# Patient Record
Sex: FEMALE | Race: WHITE | NOT HISPANIC OR LATINO | Employment: PART TIME | URBAN - METROPOLITAN AREA
[De-identification: names, ages, dates, MRNs, and addresses within clinical notes are randomized per-mention and may not be internally consistent; named-entity substitution may affect disease eponyms.]

---

## 2017-01-02 ENCOUNTER — APPOINTMENT (EMERGENCY)
Dept: RADIOLOGY | Facility: HOSPITAL | Age: 32
End: 2017-01-02
Payer: COMMERCIAL

## 2017-01-02 ENCOUNTER — HOSPITAL ENCOUNTER (EMERGENCY)
Facility: HOSPITAL | Age: 32
Discharge: HOME | End: 2017-01-02
Attending: EMERGENCY MEDICINE
Payer: COMMERCIAL

## 2017-01-02 VITALS
RESPIRATION RATE: 12 BRPM | HEART RATE: 90 BPM | SYSTOLIC BLOOD PRESSURE: 133 MMHG | DIASTOLIC BLOOD PRESSURE: 77 MMHG | OXYGEN SATURATION: 98 % | TEMPERATURE: 98.4 F
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
## 2017-01-02 DIAGNOSIS — S50.812A ABRASION OF LEFT FOREARM, INITIAL ENCOUNTER: Primary | ICD-10-CM

## 2017-01-02 DIAGNOSIS — S50.12XA CONTUSION OF LEFT FOREARM, INITIAL ENCOUNTER: ICD-10-CM

## 2017-01-02 PROCEDURE — 99284 EMERGENCY DEPT VISIT MOD MDM: CPT

## 2017-01-02 PROCEDURE — 73090 X-RAY EXAM OF FOREARM: CPT

## 2017-01-02 RX ORDER — IBUPROFEN 400 MG/1
400 TABLET ORAL EVERY 6 HOURS PRN
Status: DISCONTINUED | OUTPATIENT
Start: 2017-01-02 | End: 2017-01-02 | Stop reason: HOSPADM

## 2017-01-02 RX ORDER — NAPROXEN 250 MG/1
250 TABLET ORAL
Qty: 20 TABLET | Refills: 0 | Status: SHIPPED | OUTPATIENT
Start: 2017-01-02 | End: 2017-02-01 | Stop reason: ALTCHOICE

## 2017-01-02 RX ORDER — CYCLOBENZAPRINE HCL 10 MG
10 TABLET ORAL 2 TIMES DAILY PRN
Qty: 20 TABLET | Refills: 0 | Status: SHIPPED | OUTPATIENT
Start: 2017-01-02 | End: 2018-09-15

## 2017-01-02 RX ORDER — GINSENG 100 MG
1 CAPSULE ORAL 2 TIMES DAILY
Status: DISCONTINUED | OUTPATIENT
Start: 2017-01-02 | End: 2017-01-02 | Stop reason: HOSPADM

## 2017-01-02 RX ADMIN — BACITRACIN ZINC 1 SMALL APPLICATION: 500 OINTMENT TOPICAL at 18:49

## 2017-01-02 RX ADMIN — IBUPROFEN 400 MG: 400 TABLET ORAL at 18:49

## 2017-04-23 ENCOUNTER — HOSPITAL ENCOUNTER (EMERGENCY)
Facility: HOSPITAL | Age: 32
Discharge: HOME | End: 2017-04-24
Attending: EMERGENCY MEDICINE | Admitting: EMERGENCY MEDICINE
Payer: COMMERCIAL

## 2017-04-23 ENCOUNTER — APPOINTMENT (EMERGENCY)
Dept: CT IMAGING | Facility: HOSPITAL | Age: 32
End: 2017-04-23
Payer: COMMERCIAL

## 2017-04-23 DIAGNOSIS — D72.829 LEUKOCYTOSIS: ICD-10-CM

## 2017-04-23 DIAGNOSIS — R10.9 RIGHT SIDED ABDOMINAL PAIN: Primary | ICD-10-CM

## 2017-04-23 LAB
BASOPHILS # BLD AUTO: 0.06 THOUSANDS/ΜL (ref 0–0.1)
BASOPHILS NFR BLD AUTO: 0 % (ref 0–1)
CLARITY, POC: CLEAR
COLOR, POC: YELLOW
EOSINOPHIL # BLD AUTO: 0.52 THOUSAND/ΜL (ref 0–0.61)
EOSINOPHIL NFR BLD AUTO: 3 % (ref 0–6)
ERYTHROCYTE [DISTWIDTH] IN BLOOD BY AUTOMATED COUNT: 14.1 % (ref 11.6–15.1)
EXT BILIRUBIN, UA: NEGATIVE
EXT BLOOD URINE: NORMAL
EXT GLUCOSE, UA: NEGATIVE
EXT KETONES: NEGATIVE
EXT NITRITE, UA: NEGATIVE
EXT PH, UA: 6
EXT PROTEIN, UA: NORMAL
EXT SPECIFIC GRAVITY, UA: 1.03
EXT UROBILINOGEN: 0.2
HCG UR QL: NEGATIVE
HCT VFR BLD AUTO: 41 % (ref 34.8–46.1)
HGB BLD-MCNC: 13.2 G/DL (ref 11.5–15.4)
LYMPHOCYTES # BLD AUTO: 3.86 THOUSANDS/ΜL (ref 0.6–4.47)
LYMPHOCYTES NFR BLD AUTO: 24 % (ref 14–44)
MCH RBC QN AUTO: 27 PG (ref 26.8–34.3)
MCHC RBC AUTO-ENTMCNC: 32.2 G/DL (ref 31.4–37.4)
MCV RBC AUTO: 84 FL (ref 82–98)
MONOCYTES # BLD AUTO: 1.26 THOUSAND/ΜL (ref 0.17–1.22)
MONOCYTES NFR BLD AUTO: 8 % (ref 4–12)
NEUTROPHILS # BLD AUTO: 10.31 THOUSANDS/ΜL (ref 1.85–7.62)
NEUTS SEG NFR BLD AUTO: 65 % (ref 43–75)
PLATELET # BLD AUTO: 418 THOUSANDS/UL (ref 149–390)
PMV BLD AUTO: 9.8 FL (ref 8.9–12.7)
RBC # BLD AUTO: 4.89 MILLION/UL (ref 3.81–5.12)
WBC # BLD AUTO: 16.01 THOUSAND/UL (ref 4.31–10.16)
WBC # BLD EST: NEGATIVE 10*3/UL

## 2017-04-23 PROCEDURE — 81025 URINE PREGNANCY TEST: CPT | Performed by: EMERGENCY MEDICINE

## 2017-04-23 PROCEDURE — 96375 TX/PRO/DX INJ NEW DRUG ADDON: CPT

## 2017-04-23 PROCEDURE — 36415 COLL VENOUS BLD VENIPUNCTURE: CPT | Performed by: EMERGENCY MEDICINE

## 2017-04-23 PROCEDURE — 85025 COMPLETE CBC W/AUTO DIFF WBC: CPT | Performed by: EMERGENCY MEDICINE

## 2017-04-23 PROCEDURE — 96361 HYDRATE IV INFUSION ADD-ON: CPT

## 2017-04-23 PROCEDURE — 96374 THER/PROPH/DIAG INJ IV PUSH: CPT

## 2017-04-23 PROCEDURE — 80053 COMPREHEN METABOLIC PANEL: CPT | Performed by: EMERGENCY MEDICINE

## 2017-04-23 PROCEDURE — 81002 URINALYSIS NONAUTO W/O SCOPE: CPT | Performed by: EMERGENCY MEDICINE

## 2017-04-23 PROCEDURE — 96376 TX/PRO/DX INJ SAME DRUG ADON: CPT

## 2017-04-23 RX ORDER — DEXTROAMPHETAMINE SACCHARATE, AMPHETAMINE ASPARTATE MONOHYDRATE, DEXTROAMPHETAMINE SULFATE AND AMPHETAMINE SULFATE 5; 5; 5; 5 MG/1; MG/1; MG/1; MG/1
20 CAPSULE, EXTENDED RELEASE ORAL EVERY MORNING
COMMUNITY

## 2017-04-23 RX ORDER — MORPHINE SULFATE 10 MG/ML
6 INJECTION, SOLUTION INTRAMUSCULAR; INTRAVENOUS ONCE
Status: COMPLETED | OUTPATIENT
Start: 2017-04-23 | End: 2017-04-23

## 2017-04-23 RX ORDER — ALBUTEROL SULFATE 2.5 MG/3ML
2.5 SOLUTION RESPIRATORY (INHALATION) ONCE
Status: COMPLETED | OUTPATIENT
Start: 2017-04-23 | End: 2017-04-23

## 2017-04-23 RX ORDER — BUPROPION HYDROCHLORIDE 75 MG/1
75 TABLET ORAL 2 TIMES DAILY
COMMUNITY
End: 2018-09-15

## 2017-04-23 RX ORDER — ALBUTEROL SULFATE 90 UG/1
2 AEROSOL, METERED RESPIRATORY (INHALATION) EVERY 6 HOURS PRN
COMMUNITY

## 2017-04-23 RX ORDER — KETOROLAC TROMETHAMINE 30 MG/ML
15 INJECTION, SOLUTION INTRAMUSCULAR; INTRAVENOUS ONCE
Status: COMPLETED | OUTPATIENT
Start: 2017-04-23 | End: 2017-04-23

## 2017-04-23 RX ADMIN — ALBUTEROL SULFATE 2.5 MG: 2.5 SOLUTION RESPIRATORY (INHALATION) at 23:45

## 2017-04-23 RX ADMIN — KETOROLAC TROMETHAMINE 15 MG: 30 INJECTION, SOLUTION INTRAMUSCULAR at 23:48

## 2017-04-23 RX ADMIN — SODIUM CHLORIDE 1000 ML: 0.9 INJECTION, SOLUTION INTRAVENOUS at 22:40

## 2017-04-23 RX ADMIN — MORPHINE SULFATE 6 MG: 10 INJECTION, SOLUTION INTRAMUSCULAR; INTRAVENOUS at 22:41

## 2017-04-23 RX ADMIN — MORPHINE SULFATE 6 MG: 10 INJECTION, SOLUTION INTRAMUSCULAR; INTRAVENOUS at 23:46

## 2017-04-24 ENCOUNTER — APPOINTMENT (EMERGENCY)
Dept: CT IMAGING | Facility: HOSPITAL | Age: 32
End: 2017-04-24
Payer: COMMERCIAL

## 2017-04-24 VITALS
WEIGHT: 213.19 LBS | HEIGHT: 63 IN | OXYGEN SATURATION: 95 % | DIASTOLIC BLOOD PRESSURE: 54 MMHG | HEART RATE: 84 BPM | RESPIRATION RATE: 18 BRPM | TEMPERATURE: 98.1 F | BODY MASS INDEX: 37.77 KG/M2 | SYSTOLIC BLOOD PRESSURE: 107 MMHG

## 2017-04-24 LAB
ALBUMIN SERPL BCP-MCNC: 3.8 G/DL (ref 3.5–5)
ALP SERPL-CCNC: 86 U/L (ref 46–116)
ALT SERPL W P-5'-P-CCNC: 30 U/L (ref 12–78)
ANION GAP SERPL CALCULATED.3IONS-SCNC: 9 MMOL/L (ref 4–13)
AST SERPL W P-5'-P-CCNC: 23 U/L (ref 5–45)
BILIRUB SERPL-MCNC: 0.3 MG/DL (ref 0.2–1)
BUN SERPL-MCNC: 15 MG/DL (ref 5–25)
CALCIUM SERPL-MCNC: 9 MG/DL (ref 8.3–10.1)
CHLORIDE SERPL-SCNC: 103 MMOL/L (ref 100–108)
CO2 SERPL-SCNC: 27 MMOL/L (ref 21–32)
CREAT SERPL-MCNC: 0.76 MG/DL (ref 0.6–1.3)
GFR SERPL CREATININE-BSD FRML MDRD: >60 ML/MIN/1.73SQ M
GLUCOSE SERPL-MCNC: 89 MG/DL (ref 65–140)
POTASSIUM SERPL-SCNC: 3.6 MMOL/L (ref 3.5–5.3)
PROT SERPL-MCNC: 7.1 G/DL (ref 6.4–8.2)
SODIUM SERPL-SCNC: 139 MMOL/L (ref 136–145)

## 2017-04-24 PROCEDURE — 74177 CT ABD & PELVIS W/CONTRAST: CPT

## 2017-04-24 PROCEDURE — 99284 EMERGENCY DEPT VISIT MOD MDM: CPT

## 2017-04-24 PROCEDURE — 96361 HYDRATE IV INFUSION ADD-ON: CPT

## 2017-04-24 RX ORDER — IBUPROFEN 400 MG/1
800 TABLET ORAL ONCE
Status: COMPLETED | OUTPATIENT
Start: 2017-04-24 | End: 2017-04-24

## 2017-04-24 RX ORDER — NAPROXEN 500 MG/1
500 TABLET ORAL 2 TIMES DAILY WITH MEALS
Qty: 20 TABLET | Refills: 0 | Status: SHIPPED | OUTPATIENT
Start: 2017-04-24 | End: 2018-09-15

## 2017-04-24 RX ORDER — OXYCODONE HYDROCHLORIDE AND ACETAMINOPHEN 5; 325 MG/1; MG/1
1 TABLET ORAL EVERY 4 HOURS PRN
Qty: 15 TABLET | Refills: 0 | Status: SHIPPED | OUTPATIENT
Start: 2017-04-24 | End: 2017-05-04

## 2017-04-24 RX ORDER — OXYCODONE HYDROCHLORIDE AND ACETAMINOPHEN 5; 325 MG/1; MG/1
1 TABLET ORAL ONCE
Status: COMPLETED | OUTPATIENT
Start: 2017-04-24 | End: 2017-04-24

## 2017-04-24 RX ADMIN — IBUPROFEN 800 MG: 400 TABLET ORAL at 01:50

## 2017-04-24 RX ADMIN — IOHEXOL 100 ML: 350 INJECTION, SOLUTION INTRAVENOUS at 00:33

## 2017-04-24 RX ADMIN — OXYCODONE HYDROCHLORIDE AND ACETAMINOPHEN 1 TABLET: 5; 325 TABLET ORAL at 01:50

## 2018-09-15 ENCOUNTER — APPOINTMENT (EMERGENCY)
Dept: RADIOLOGY | Facility: HOSPITAL | Age: 33
End: 2018-09-15
Payer: COMMERCIAL

## 2018-09-15 ENCOUNTER — HOSPITAL ENCOUNTER (EMERGENCY)
Facility: HOSPITAL | Age: 33
Discharge: HOME | End: 2018-09-15
Attending: EMERGENCY MEDICINE | Admitting: EMERGENCY MEDICINE
Payer: COMMERCIAL

## 2018-09-15 VITALS
HEART RATE: 84 BPM | WEIGHT: 200 LBS | DIASTOLIC BLOOD PRESSURE: 58 MMHG | RESPIRATION RATE: 18 BRPM | BODY MASS INDEX: 35.43 KG/M2 | TEMPERATURE: 98.2 F | OXYGEN SATURATION: 94 % | SYSTOLIC BLOOD PRESSURE: 122 MMHG

## 2018-09-15 DIAGNOSIS — T50.901A ACCIDENTAL OVERDOSE, INITIAL ENCOUNTER: ICD-10-CM

## 2018-09-15 DIAGNOSIS — R41.82 ALTERED MENTAL STATE: Primary | ICD-10-CM

## 2018-09-15 LAB
ALBUMIN SERPL BCP-MCNC: 3.4 G/DL (ref 3.5–5)
ALP SERPL-CCNC: 77 U/L (ref 46–116)
ALT SERPL W P-5'-P-CCNC: 30 U/L (ref 12–78)
AMPHETAMINES SERPL QL SCN: POSITIVE
ANION GAP SERPL CALCULATED.3IONS-SCNC: 8 MMOL/L (ref 4–13)
APAP SERPL-MCNC: <2 UG/ML (ref 10–30)
AST SERPL W P-5'-P-CCNC: 23 U/L (ref 5–45)
ATRIAL RATE: 87 BPM
BACTERIA UR QL AUTO: ABNORMAL /HPF
BARBITURATES UR QL: NEGATIVE
BASOPHILS # BLD AUTO: 0.05 THOUSANDS/ΜL (ref 0–0.1)
BASOPHILS NFR BLD AUTO: 0 % (ref 0–1)
BENZODIAZ UR QL: NEGATIVE
BILIRUB SERPL-MCNC: 0.23 MG/DL (ref 0.2–1)
BILIRUB UR QL STRIP: NEGATIVE
BUN SERPL-MCNC: 10 MG/DL (ref 5–25)
CALCIUM SERPL-MCNC: 8.3 MG/DL (ref 8.3–10.1)
CHLORIDE SERPL-SCNC: 108 MMOL/L (ref 100–108)
CLARITY UR: CLEAR
CO2 SERPL-SCNC: 23 MMOL/L (ref 21–32)
COCAINE UR QL: NEGATIVE
COLOR UR: YELLOW
CREAT SERPL-MCNC: 0.73 MG/DL (ref 0.6–1.3)
EOSINOPHIL # BLD AUTO: 0.29 THOUSAND/ΜL (ref 0–0.61)
EOSINOPHIL NFR BLD AUTO: 2 % (ref 0–6)
ERYTHROCYTE [DISTWIDTH] IN BLOOD BY AUTOMATED COUNT: 14 % (ref 11.6–15.1)
ETHANOL SERPL-MCNC: <3 MG/DL (ref 0–3)
EXT PREG TEST URINE: NORMAL
GFR SERPL CREATININE-BSD FRML MDRD: 109 ML/MIN/1.73SQ M
GLUCOSE SERPL-MCNC: 107 MG/DL (ref 65–140)
GLUCOSE UR STRIP-MCNC: NEGATIVE MG/DL
HCT VFR BLD AUTO: 39.8 % (ref 34.8–46.1)
HGB BLD-MCNC: 12.6 G/DL (ref 11.5–15.4)
HGB UR QL STRIP.AUTO: ABNORMAL
IMM GRANULOCYTES # BLD AUTO: 0.06 THOUSAND/UL (ref 0–0.2)
IMM GRANULOCYTES NFR BLD AUTO: 1 % (ref 0–2)
KETONES UR STRIP-MCNC: NEGATIVE MG/DL
LEUKOCYTE ESTERASE UR QL STRIP: ABNORMAL
LYMPHOCYTES # BLD AUTO: 2.57 THOUSANDS/ΜL (ref 0.6–4.47)
LYMPHOCYTES NFR BLD AUTO: 19 % (ref 14–44)
MCH RBC QN AUTO: 27 PG (ref 26.8–34.3)
MCHC RBC AUTO-ENTMCNC: 31.7 G/DL (ref 31.4–37.4)
MCV RBC AUTO: 85 FL (ref 82–98)
METHADONE UR QL: NEGATIVE
MONOCYTES # BLD AUTO: 1.24 THOUSAND/ΜL (ref 0.17–1.22)
MONOCYTES NFR BLD AUTO: 9 % (ref 4–12)
NEUTROPHILS # BLD AUTO: 9.08 THOUSANDS/ΜL (ref 1.85–7.62)
NEUTS SEG NFR BLD AUTO: 69 % (ref 43–75)
NITRITE UR QL STRIP: NEGATIVE
NON-SQ EPI CELLS URNS QL MICRO: ABNORMAL /HPF
NRBC BLD AUTO-RTO: 0 /100 WBCS
OPIATES UR QL SCN: NEGATIVE
P AXIS: 67 DEGREES
PCP UR QL: NEGATIVE
PH UR STRIP.AUTO: 6 [PH] (ref 4.5–8)
PLATELET # BLD AUTO: 361 THOUSANDS/UL (ref 149–390)
PMV BLD AUTO: 9.7 FL (ref 8.9–12.7)
POTASSIUM SERPL-SCNC: 4.1 MMOL/L (ref 3.5–5.3)
PR INTERVAL: 146 MS
PROT SERPL-MCNC: 6.5 G/DL (ref 6.4–8.2)
PROT UR STRIP-MCNC: NEGATIVE MG/DL
QRS AXIS: 65 DEGREES
QRSD INTERVAL: 92 MS
QT INTERVAL: 368 MS
QTC INTERVAL: 442 MS
RBC # BLD AUTO: 4.67 MILLION/UL (ref 3.81–5.12)
RBC #/AREA URNS AUTO: ABNORMAL /HPF
SALICYLATES SERPL-MCNC: <3 MG/DL (ref 3–20)
SODIUM SERPL-SCNC: 139 MMOL/L (ref 136–145)
SP GR UR STRIP.AUTO: <=1.005 (ref 1–1.03)
T WAVE AXIS: 36 DEGREES
THC UR QL: NEGATIVE
TROPONIN I SERPL-MCNC: <0.02 NG/ML
UROBILINOGEN UR QL STRIP.AUTO: 0.2 E.U./DL
VENTRICULAR RATE: 87 BPM
WBC # BLD AUTO: 13.29 THOUSAND/UL (ref 4.31–10.16)
WBC #/AREA URNS AUTO: ABNORMAL /HPF

## 2018-09-15 PROCEDURE — 96374 THER/PROPH/DIAG INJ IV PUSH: CPT

## 2018-09-15 PROCEDURE — 80329 ANALGESICS NON-OPIOID 1 OR 2: CPT | Performed by: EMERGENCY MEDICINE

## 2018-09-15 PROCEDURE — 80307 DRUG TEST PRSMV CHEM ANLYZR: CPT | Performed by: EMERGENCY MEDICINE

## 2018-09-15 PROCEDURE — 80053 COMPREHEN METABOLIC PANEL: CPT | Performed by: EMERGENCY MEDICINE

## 2018-09-15 PROCEDURE — 93005 ELECTROCARDIOGRAM TRACING: CPT

## 2018-09-15 PROCEDURE — 84484 ASSAY OF TROPONIN QUANT: CPT | Performed by: EMERGENCY MEDICINE

## 2018-09-15 PROCEDURE — 81025 URINE PREGNANCY TEST: CPT | Performed by: EMERGENCY MEDICINE

## 2018-09-15 PROCEDURE — 71046 X-RAY EXAM CHEST 2 VIEWS: CPT

## 2018-09-15 PROCEDURE — 96361 HYDRATE IV INFUSION ADD-ON: CPT

## 2018-09-15 PROCEDURE — 96375 TX/PRO/DX INJ NEW DRUG ADDON: CPT

## 2018-09-15 PROCEDURE — 80320 DRUG SCREEN QUANTALCOHOLS: CPT | Performed by: EMERGENCY MEDICINE

## 2018-09-15 PROCEDURE — 99285 EMERGENCY DEPT VISIT HI MDM: CPT

## 2018-09-15 PROCEDURE — 81001 URINALYSIS AUTO W/SCOPE: CPT

## 2018-09-15 PROCEDURE — 93010 ELECTROCARDIOGRAM REPORT: CPT | Performed by: INTERNAL MEDICINE

## 2018-09-15 PROCEDURE — 36415 COLL VENOUS BLD VENIPUNCTURE: CPT | Performed by: EMERGENCY MEDICINE

## 2018-09-15 PROCEDURE — 85025 COMPLETE CBC W/AUTO DIFF WBC: CPT | Performed by: EMERGENCY MEDICINE

## 2018-09-15 RX ORDER — METOCLOPRAMIDE HYDROCHLORIDE 5 MG/ML
10 INJECTION INTRAMUSCULAR; INTRAVENOUS ONCE
Status: COMPLETED | OUTPATIENT
Start: 2018-09-15 | End: 2018-09-15

## 2018-09-15 RX ORDER — VILAZODONE HYDROCHLORIDE 20 MG/1
40 TABLET ORAL
COMMUNITY

## 2018-09-15 RX ORDER — ONDANSETRON 2 MG/ML
INJECTION INTRAMUSCULAR; INTRAVENOUS
Status: COMPLETED
Start: 2018-09-15 | End: 2018-09-15

## 2018-09-15 RX ORDER — ONDANSETRON 2 MG/ML
4 INJECTION INTRAMUSCULAR; INTRAVENOUS ONCE
Status: COMPLETED | OUTPATIENT
Start: 2018-09-15 | End: 2018-09-15

## 2018-09-15 RX ADMIN — SODIUM CHLORIDE 1000 ML: 0.9 INJECTION, SOLUTION INTRAVENOUS at 14:44

## 2018-09-15 RX ADMIN — ONDANSETRON 4 MG: 2 INJECTION INTRAMUSCULAR; INTRAVENOUS at 14:43

## 2018-09-15 RX ADMIN — METOCLOPRAMIDE 10 MG: 5 INJECTION, SOLUTION INTRAMUSCULAR; INTRAVENOUS at 16:20

## 2018-09-15 NOTE — ED ATTENDING ATTESTATION
Kerry Abdul MD, saw and evaluated the patient  I have discussed the patient with the resident/non-physician practitioner and agree with the resident's/non-physician practitioner's findings, Plan of Care, and MDM as documented in the resident's/non-physician practitioner's note, except where noted  All available labs and Radiology studies were reviewed  At this point I agree with the current assessment done in the Emergency Department  I have conducted an independent evaluation of this patient a history and physical is as follows: Pt presents unresponsive in bath Pt had eaten soup prior Pt co gerd chest pain and was then unresponsive in tub Pt took usual meds today  Pt took new depression med today( vilazodone)   attempted to wake patient but she remains somnolent but arousal Pt received narcan with some improvement   PE: alert able to interact and answer questions appropriately  perrl heart reg lungs clear abdomen soft nontender neuro nonfocal MDM: pt had neg mri mra at [facility] recently of her head and neck will check labs drug screen May be related to new med     Critical Care Time  CritCare Time    Procedures

## 2018-09-15 NOTE — DISCHARGE INSTRUCTIONS
Adult Overdose   WHAT YOU NEED TO KNOW:   An overdose occurs when you take more medicine than is safe to take  An overdose may be mild, or it may be a life-threatening emergency  You may feel drowsy, dizzy, or nauseated, depending on what medicine you took  No specific harm was found to your body as a result of your overdose  Your symptoms have decreased over the last 6 to 12 hours  DISCHARGE INSTRUCTIONS:   Call 911 if you or someone close to you has any of the following symptoms:   · Your face is very pale and clammy to the touch  · Your body is limp or you are unable to speak  · You cannot be awakened  · Your breathing is slower or faster than usual      · Your heart is beating slower than usual     · You feel confused or more tired than usual, or you are sweating more than normal     · Your speech is slurred  · Your fingernails or lips are blue or purple  Return to the emergency department if:   · You have severe nausea and vomiting  · You cannot have a bowel movement or urinate  · Your skin and the whites of your eyes turn yellow  Contact your healthcare provider if:   · You think your medicine is not working  · You have nausea, vomiting, diarrhea, or abdominal cramps  · You have questions or concerns about your medicine  Take your medicine as directed:  Contact your healthcare provider if you think your medicine is not helping or if you have side effects  Do not take more medicine that is prescribed  Keep your medicines in the original containers  Keep a list of the medicines, vitamins, and herbs you take  Include the amounts, and when and why you take them  Do not share your medicine with others  Prevent another overdose:   · Read labels carefully  Read the labels of all the medicines that you take  Never take more than the label says to take  If you have questions, ask your pharmacist or healthcare provider  · Do not drink alcohol    Alcohol increases your risk for another overdose  Alcohol can also hide important symptoms that you need to call your healthcare provider for  · Do not drive or operate machinery  until your healthcare provider says it is okay  These activities may be dangerous after an overdose  · Use caution if you take more than one medicine at a time  Mixing medicines or taking more than one medicine at a time can be dangerous  · Tell your family or friends what medicines you are taking  Talk with them about what to do if you have an overdose  Follow up with your healthcare provider as directed: You may need to see a counselor or psychiatrist  Write down your questions so you remember to ask them during your visits  © 2017 [employer]  Information is for End User's use only and may not be sold, redistributed or otherwise used for commercial purposes  All illustrations and images included in CareNotes® are the copyrighted property of A D A M , Inc  or [employer]  The above information is an  only  It is not intended as medical advice for individual conditions or treatments  Talk to your doctor, nurse or pharmacist before following any medical regimen to see if it is safe and effective for you  Altered Mental Status   WHAT YOU NEED TO KNOW:   Altered mental status (AMS) is a disruption in how your brain works that causes a change in behavior  This change can happen suddenly or over days  AMS ranges from slight confusion to total disorientation and increased sleepiness to coma  DISCHARGE INSTRUCTIONS:   Medicines:   · Antibiotics  help fight or prevent infection  Take your antibiotics until they are gone, even if you feel better  · Take your medicine as directed  Contact your healthcare provider if you think your medicine is not helping or if you have side effects  Tell him of her if you are allergic to any medicine  Keep a list of the medicines, vitamins, and herbs you take   Include the amounts, and when and why you take them  Bring the list or the pill bottles to follow-up visits  Carry your medicine list with you in case of an emergency  Follow up with your healthcare provider as directed:  Write down your questions so you remember to ask them during your visits  Contact your healthcare provider if:   · You have sudden changes in behavior  · You are more sleepy or confused than usual      · You have questions or concerns about your condition or care  Seek care immediately or call 911 if:   · Your speech is slurred or you are rambling  · You have a seizure  · You are not able to move any part of your body freely  · Someone close to you cannot wake you up  © 2017 [employer] Information is for End User's use only and may not be sold, redistributed or otherwise used for commercial purposes  All illustrations and images included in CareNotes® are the copyrighted property of [employer]  or [employer]  The above information is an  only  It is not intended as medical advice for individual conditions or treatments  Talk to your doctor, nurse or pharmacist before following any medical regimen to see if it is safe and effective for you

## 2018-09-16 NOTE — ED PROVIDER NOTES
History  Chief Complaint   Patient presents with    Loss of Consciousness     Patient was at home c/o chest pain, thought it was indigestion and  went to go get TUMS, reports that when he got home water was still running and patient was unresponsive in  the bath tub  EMS gave 2mg of Narcan and patient started to wake up  30-year-old female with history of anxiety, depression presents to the emergency department with complaint of unresponsiveness and somnolence  Patient went to lunch with her  and when she came home told him she was drawing a bath when he went to check on her she was unconscious in the tub  He tried her off and brought in the downstairs when he called EMS brought her to the emergency department for evaluation  Patient takes Adderall for her ADHD and states she has not taken more than her regularly prescribed medications including asthma medications  Patient was recently started on [place] Proffer which she took a 1st dose today  She has no other medication changes she denies any other drug or alcohol use and has been otherwise healthy  Patient had a negative toxicology workup and with IV fluids her mental status improved and she became less somnolent and tired  History provided by:  Patient, spouse and EMS personnel   used: No    Altered Mental Status   Presenting symptoms: lethargy    Severity:  Moderate  Most recent episode: Today  Episode history:  Single  Timing:  Constant  Progression:  Unchanged  Chronicity:  New  Context: not alcohol use, not drug use, not head injury, taking medications as prescribed, not nursing home resident and not recent illness        Prior to Admission Medications   Prescriptions Last Dose Informant Patient Reported? Taking?    Mometasone Furo-Formoterol Fum (DULERA) 100-5 MCG/ACT AERO   Yes Yes   Sig: Inhale 1 puff daily   albuterol (PROVENTIL HFA,VENTOLIN HFA) 90 mcg/act inhaler   Yes Yes   Sig: Inhale 2 puffs every 6 (six) hours as needed for wheezing   amphetamine-dextroamphetamine (ADDERALL XR) 20 MG 24 hr capsule   Yes Yes   Sig: Take 20 mg by mouth every morning   atomoxetine (STRATTERA) 10 MG capsule   Yes Yes   Sig: Take 10 mg by mouth daily Patient is unsure of the dosage   vilazodone (VIIBRYD) 20 mg tablet   Yes Yes   Sig: Take 40 mg by mouth daily with breakfast      Facility-Administered Medications: None       Past Medical History:   Diagnosis Date    Allergic rhinitis     Asthma     Psychiatric disorder        Past Surgical History:   Procedure Laterality Date    LAPAROSCOPIC ENDOMETRIOSIS FULGURATION      TONSILLECTOMY      WRIST SURGERY Right     wrist shattered / bone graft       Family History   Problem Relation Age of Onset    Diabetes Mother     Hypertension Mother     Hyperlipidemia Father     Hypertension Father     Arthritis Paternal Grandfather     Osteoporosis Paternal Grandfather      I have reviewed and agree with the history as documented  Social History   Substance Use Topics    Smoking status: Current Every Day Smoker     Packs/day: 0 25    Smokeless tobacco: Never Used      Comment: patient on chantix to quit    Alcohol use No        Review of Systems   Unable to perform ROS: Mental status change       Physical Exam  ED Triage Vitals [09/15/18 1425]   Temperature Pulse Respirations Blood Pressure SpO2   98 2 °F (36 8 °C) 101 18 138/65 98 %      Temp Source Heart Rate Source Patient Position - Orthostatic VS BP Location FiO2 (%)   Oral Monitor Lying Right arm --      Pain Score       8           Orthostatic Vital Signs  Vitals:    09/15/18 1425 09/15/18 1515 09/15/18 1623   BP: 138/65 139/73 122/58   Pulse: 101 98 84   Patient Position - Orthostatic VS: Lying Lying Lying       Physical Exam   Constitutional: She is oriented to person, place, and time  She appears well-developed and well-nourished  No distress  HENT:   Head: Normocephalic and atraumatic     Eyes: Conjunctivae and EOM are normal  No scleral icterus  Neck: Normal range of motion  Neck supple  Cardiovascular: Normal rate and regular rhythm  Murmur heard  Systolic murmur is present with a grade of 2/6   Pulmonary/Chest: Effort normal and breath sounds normal  No respiratory distress  Abdominal: Soft  Bowel sounds are normal  There is no tenderness  Musculoskeletal: Normal range of motion  Neurological: She is alert and oriented to person, place, and time  Skin: Skin is warm and dry  Psychiatric: She has a normal mood and affect  Her behavior is normal    Nursing note and vitals reviewed  ED Medications  Medications    EMS REPLENISHMENT MED ( Does not apply Given to EMS 9/15/18 1431)   sodium chloride 0 9 % bolus 1,000 mL (0 mL Intravenous Stopped 9/15/18 1544)   ondansetron (ZOFRAN) injection 4 mg (4 mg Intravenous Given 9/15/18 1443)   metoclopramide (REGLAN) injection 10 mg (10 mg Intravenous Given 9/15/18 1620)       Diagnostic Studies  Results Reviewed     Procedure Component Value Units Date/Time    Urine Microscopic [[id number]]  (Abnormal) Collected:  09/15/18 1514    Lab Status:  Final result Specimen:  Urine from Urine, Other Updated:  09/15/18 1622     RBC, UA None Seen /hpf      WBC, UA 2-4 (A) /hpf      Epithelial Cells Occasional /hpf      Bacteria, UA None Seen /hpf     Rapid drug screen, urine [[id number]]  (Abnormal) Collected:  09/15/18 1520    Lab Status:  Final result Specimen:  Urine from Urine, Other Updated:  09/15/18 1550     Amph/Meth UR Positive (A)     Barbiturate Ur Negative     Benzodiazepine Urine Negative     Cocaine Urine Negative     Methadone Urine Negative     Opiate Urine Negative     PCP Ur Negative     THC Urine Negative    Narrative:         FOR MEDICAL PURPOSES ONLY  IF CONFIRMATION NEEDED PLEASE CONTACT THE LAB WITHIN 5 DAYS      Drug Screen Cutoff Levels:  AMPHETAMINE/METHAMPHETAMINES  1000 ng/mL  BARBITURATES     200 ng/mL  BENZODIAZEPINES     200 ng/mL  COCAINE      300 ng/mL  METHADONE      300 ng/mL  OPIATES      300 ng/mL  PHENCYCLIDINE     25 ng/mL  THC       50 ng/mL    POCT pregnancy, urine [[id number]]  (Normal) Resulted:  09/15/18 1516    Lab Status:  Final result Updated:  09/15/18 1516     EXT PREG TEST UR (Ref: Negative) negative result    ED Urine Macroscopic [[id number]]  (Abnormal) Collected:  09/15/18 1514    Lab Status:  Final result Specimen:  Urine Updated:  09/15/18 1515     Color, UA Yellow     Clarity, UA Clear     pH, UA 6 0     Leukocytes, UA Small (A)     Nitrite, UA Negative     Protein, UA Negative mg/dl      Glucose, UA Negative mg/dl      Ketones, UA Negative mg/dl      Urobilinogen, UA 0 2 E U /dl      Bilirubin, UA Negative     Blood, UA Trace (A)     Specific Scro[place]ins, UA <=1 005    Narrative:       CLINITEK RESULT    Comprehensive metabolic panel [[id number]]  (Abnormal) Collected:  09/15/18 1439    Lab Status:  Final result Specimen:  Blood from Arm, Left Updated:  09/15/18 1511     Sodium 139 mmol/L      Potassium 4 1 mmol/L      Chloride 108 mmol/L      CO2 23 mmol/L      ANION GAP 8 mmol/L      BUN 10 mg/dL      Creatinine 0 73 mg/dL      Glucose 107 mg/dL      Calcium 8 3 mg/dL      AST 23 U/L      ALT 30 U/L      Alkaline Phosphatase 77 U/L      Total Protein 6 5 g/dL      Albumin 3 4 (L) g/dL      Total Bilirubin 0 23 mg/dL      eGFR 109 ml/min/1 73sq m     Narrative:         National Kidney Disease Education Program recommendations are as follows:  GFR calculation is accurate only with a steady state creatinine  Chronic Kidney disease less than 60 ml/min/1 73 sq  meters  Kidney failure less than 15 ml/min/1 73 sq  meters      Troponin I [[id number]]  (Normal) Collected:  09/15/18 1439    Lab Status:  Final result Specimen:  Blood from Arm, Left Updated:  09/15/18 1511     Troponin I <0 02 ng/mL     Ethanol [[id number]]  (Normal) Collected:  09/15/18 1439    Lab Status:  Final result Specimen:  Blood from Arm, Left Updated:  09/15/18 1511     Ethanol Lvl <3 mg/dL     Salicylate level [[id number]]  (Abnormal) Collected:  09/15/18 1439    Lab Status:  Final result Specimen:  Blood from Arm, Left Updated:  94/82/60 3729     Salicylate Lvl <3 (L) mg/dL     Acetaminophen level [[id number]]  (Abnormal) Collected:  09/15/18 1439    Lab Status:  Final result Specimen:  Blood from Arm, Left Updated:  09/15/18 1511     Acetaminophen Level <2 (L) ug/mL     CBC and differential [[id number]]  (Abnormal) Collected:  09/15/18 1439    Lab Status:  Final result Specimen:  Blood from Arm, Left Updated:  09/15/18 1507     WBC 13 29 (H) Thousand/uL      RBC 4 67 Million/uL      Hemoglobin 12 6 g/dL      Hematocrit 39 8 %      MCV 85 fL      MCH 27 0 pg      MCHC 31 7 g/dL      RDW 14 0 %      MPV 9 7 fL      Platelets 192 Thousands/uL      nRBC 0 /100 WBCs      Neutrophils Relative 69 %      Immat GRANS % 1 %      Lymphocytes Relative 19 %      Monocytes Relative 9 %      Eosinophils Relative 2 %      Basophils Relative 0 %      Neutrophils Absolute 9 08 (H) Thousands/µL      Immature Grans Absolute 0 06 Thousand/uL      Lymphocytes Absolute 2 57 Thousands/µL      Monocytes Absolute 1 24 (H) Thousand/µL      Eosinophils Absolute 0 29 Thousand/µL      Basophils Absolute 0 05 Thousands/µL                  XR chest 2 views    (Results Pending)         Procedures  Procedures      Phone Consults  ED Phone Contact    ED Course                               MDM  Number of Diagnoses or Management Options  Accidental overdose, initial encounter: new and requires workup  Altered mental state: new and requires workup  Diagnosis management comments: Patient had negative coma panel workup and became a somnolent with IV fluids only complaining of nausea without vomiting  Provided patient with IV antiemetics and gave close return precautions with follow-up to her PCP on Monday for adjustment of her medications namely stopping the offending agent         Amount and/or Complexity of Data Reviewed  Clinical lab tests: ordered and reviewed  Tests in the medicine section of CPT®: ordered and reviewed  Obtain history from someone other than the patient: yes  Review and summarize past medical records: yes  Independent visualization of images, tracings, or specimens: yes      CritCare Time    Disposition  Final diagnoses: Altered mental state   Accidental overdose, initial encounter     Time reflects when diagnosis was documented in both MDM as applicable and the Disposition within this note     Time User Action Codes Description Comment    9/15/2018  4:27 PM Justin Batters Add [R41 82] Altered mental state     9/15/2018  4:27 PM Justin Batters Add [T50 901A] Accidental overdose, initial encounter       ED Disposition     ED Disposition Condition Comment    Discharge  [place] discharge to home/self care      Condition at discharge: Stable        Follow-up Information     Follow up With Specialties Details Why Contact Info Additional Information    Kellie Alfredo MD Family Medicine Schedule an appointment as soon as possible for a visit  [address] [facility] [id number]  [address] [facility] Emergency Department Emergency Medicine  If symptoms worsen [address]  [phone]  ED, [address] [place] [address], [facility]          Discharge Medication List as of 9/15/2018  4:28 PM      CONTINUE these medications which have NOT CHANGED    Details   albuterol (PROVENTIL HFA,VENTOLIN HFA) 90 mcg/act inhaler Inhale 2 puffs every 6 (six) hours as needed for wheezing, Until Discontinued, Historical Med      amphetamine-dextroamphetamine (ADDERALL XR) 20 MG 24 hr capsule Take 20 mg by mouth every morning, Until Discontinued, Historical Med      atomoxetine (STRATTERA) 10 MG capsule Take 10 mg by mouth daily Patient is unsure of the dosage, Until Discontinued, Historical Med      Mometasone Furo-Formoterol Fum (DULERA) 100-5 MCG/ACT AERO Inhale 1 puff daily, Until Discontinued, Historical Med      vilazodone (VIIBRYD) 20 mg tablet Take 40 mg by mouth daily with breakfast, Historical Med           No discharge procedures on file  ED Provider  Attending physically available and evaluated Rola Benitesmarian PARKS managed the patient along with the ED Attending      Electronically Signed by         Makenna Fletcher MD  09/16/18 3270

## 2020-04-06 ENCOUNTER — NURSE TRIAGE (OUTPATIENT)
Dept: OTHER | Facility: OTHER | Age: 35
End: 2020-04-06

## 2020-04-06 DIAGNOSIS — U07.1 COVID-19: Primary | ICD-10-CM

## 2020-04-06 DIAGNOSIS — U07.1 COVID-19: ICD-10-CM

## 2020-04-06 PROCEDURE — 87635 SARS-COV-2 COVID-19 AMP PRB: CPT

## 2020-04-07 LAB — SARS-COV-2 RNA SPEC QL NAA+PROBE: NOT DETECTED

## 2022-03-04 ENCOUNTER — APPOINTMENT (EMERGENCY)
Dept: RADIOLOGY | Facility: HOSPITAL | Age: 37
End: 2022-03-04
Payer: COMMERCIAL

## 2022-03-04 ENCOUNTER — HOSPITAL ENCOUNTER (EMERGENCY)
Facility: HOSPITAL | Age: 37
Discharge: HOME | End: 2022-03-05
Attending: EMERGENCY MEDICINE
Payer: COMMERCIAL

## 2022-03-04 VITALS
HEART RATE: 102 BPM | RESPIRATION RATE: 16 BRPM | DIASTOLIC BLOOD PRESSURE: 83 MMHG | OXYGEN SATURATION: 99 % | SYSTOLIC BLOOD PRESSURE: 191 MMHG | TEMPERATURE: 98.6 F

## 2022-03-04 DIAGNOSIS — S90.512A ABRASION OF LEFT ANKLE, INITIAL ENCOUNTER: ICD-10-CM

## 2022-03-04 DIAGNOSIS — S87.82XA CRUSH INJURY LOWER LEG, LEFT, INITIAL ENCOUNTER: Primary | ICD-10-CM

## 2022-03-04 DIAGNOSIS — S82.892A AVULSION FRACTURE OF LEFT ANKLE: ICD-10-CM

## 2022-03-04 PROCEDURE — 90715 TDAP VACCINE 7 YRS/> IM: CPT | Performed by: EMERGENCY MEDICINE

## 2022-03-04 PROCEDURE — 73610 X-RAY EXAM OF ANKLE: CPT

## 2022-03-04 PROCEDURE — 90471 IMMUNIZATION ADMIN: CPT

## 2022-03-04 PROCEDURE — 99284 EMERGENCY DEPT VISIT MOD MDM: CPT | Performed by: PHYSICIAN ASSISTANT

## 2022-03-04 PROCEDURE — 73590 X-RAY EXAM OF LOWER LEG: CPT

## 2022-03-04 PROCEDURE — 99283 EMERGENCY DEPT VISIT LOW MDM: CPT

## 2022-03-04 PROCEDURE — 99283 EMERGENCY DEPT VISIT LOW MDM: CPT | Performed by: EMERGENCY MEDICINE

## 2022-03-04 RX ORDER — IBUPROFEN 400 MG/1
800 TABLET ORAL ONCE
Status: COMPLETED | OUTPATIENT
Start: 2022-03-04 | End: 2022-03-04

## 2022-03-04 RX ORDER — GINSENG 100 MG
2 CAPSULE ORAL ONCE
Status: COMPLETED | OUTPATIENT
Start: 2022-03-04 | End: 2022-03-04

## 2022-03-04 RX ORDER — NAPROXEN 500 MG/1
500 TABLET ORAL 2 TIMES DAILY WITH MEALS
Qty: 20 TABLET | Refills: 0 | Status: SHIPPED | OUTPATIENT
Start: 2022-03-04 | End: 2022-03-14

## 2022-03-04 RX ORDER — OXYCODONE HYDROCHLORIDE AND ACETAMINOPHEN 5; 325 MG/1; MG/1
1 TABLET ORAL EVERY 4 HOURS PRN
Qty: 20 TABLET | Refills: 0 | Status: SHIPPED | OUTPATIENT
Start: 2022-03-04 | End: 2022-03-09

## 2022-03-04 RX ADMIN — IBUPROFEN 800 MG: 400 TABLET, FILM COATED ORAL at 22:15

## 2022-03-04 RX ADMIN — BACITRACIN ZINC 2 SMALL APPLICATION: 500 OINTMENT TOPICAL at 22:24

## 2022-03-04 RX ADMIN — TETANUS TOXOID, REDUCED DIPHTHERIA TOXOID AND ACELLULAR PERTUSSIS VACCINE, ADSORBED 0.5 ML: 5; 2.5; 8; 8; 2.5 SUSPENSION INTRAMUSCULAR at 22:17

## 2022-03-04 NOTE — Clinical Note
Charly Vargas was seen and treated in our emergency department on 3/4/2022  Diagnosis:     [place]    She may return on this date:     No work until cleared by orthopedist     If you have any questions or concerns, please don't hesitate to call        Yosvany Vo MD    ______________________________           _______________          _______________  Hospital Representative                              Date                                Time

## 2022-03-04 NOTE — Clinical Note
Crystal Nielsen was seen and treated in our emergency department on 3/4/2022  Diagnosis:     [place]ahl[place] Emmanuel    She may return on this date:     No work until cleared by orthopedist     If you have any questions or concerns, please don't hesitate to call        Luba Butts MD    ______________________________           _______________          _______________  Hospital Representative                              Date                                Time
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
## 2022-03-09 ENCOUNTER — OFFICE VISIT (OUTPATIENT)
Dept: OBGYN CLINIC | Facility: CLINIC | Age: 37
End: 2022-03-09
Payer: COMMERCIAL

## 2022-03-09 VITALS
SYSTOLIC BLOOD PRESSURE: 146 MMHG | BODY MASS INDEX: 35.44 KG/M2 | WEIGHT: 200 LBS | HEART RATE: 99 BPM | DIASTOLIC BLOOD PRESSURE: 85 MMHG | HEIGHT: 63 IN

## 2022-03-09 DIAGNOSIS — S93.492A SPRAIN OF ANTERIOR TALOFIBULAR LIGAMENT OF LEFT ANKLE, INITIAL ENCOUNTER: Primary | ICD-10-CM

## 2022-03-09 PROCEDURE — 99203 OFFICE O/P NEW LOW 30 MIN: CPT | Performed by: ORTHOPAEDIC SURGERY

## 2022-03-09 RX ORDER — HYDROXYCHLOROQUINE SULFATE 200 MG/1
TABLET, FILM COATED ORAL
COMMUNITY
Start: 2022-02-15

## 2022-03-09 RX ORDER — BUDESONIDE AND FORMOTEROL FUMARATE DIHYDRATE 160; 4.5 UG/1; UG/1
AEROSOL RESPIRATORY (INHALATION)
COMMUNITY
Start: 2022-03-01
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
## 2022-03-09 NOTE — PATIENT INSTRUCTIONS
If you need to use a walking boot at first to start walking, that is permitted  But you should be out of the boot and into a sneaker no later than 10-14 days from now  So by 3/22, you should be completely out of the boot and into a supporting  You have sprained your ankle  Over the next 4 weeks it is important you follow these instructions; Lateral Ankle Sprain Protocol - [facility] Orthopaedic Foot and Ankle  Acute Phase: Days 1-3  Goals: Decrease pain and swelling, protect from further injury  · Pain and swelling management (RICE)  · Protection of injured ligaments (activity modification, supportive sneakers, occasionally a boot is necessary for a week or two at most)  · Gait-WBAT  Sub-Acute Phase: 2-4 days to 2 weeks  Goals: Decrease/eliminate pain, increase ROM, decrease swelling, increase strength  · Continue pain and swelling management  · Subtalar and talocrurcal joint mobilizations  · ROM with pain-free range: DF/PF/EV/IV AROM, calf stretching  · Isometric strengthening  Rehabilitative Phase: 2-6 weeks  Goals: Regain ROM and strength, increase endurance and proprioception  · Continue joint mobilizations and stretching  · Progress to pain-free concentric and eccentric strengthening exercises (both open chain and closed chain)  · Proprioception exercises (balance board, BAPS board, single leg stance etc )  · Gait training-promote equal weight beraing and weaning of assistive devices  · Endurance activities (stationary biking, swimming, walking, etc )  Functional Phase: 6 weeks  Goals: Return to full activity and function  · Continue strengthening exercises  · Coordination and agility training-depends on patient's prior level of function, recreational activities, and goals         Treating your Sprained Ankle  Treating your sprained ankle properly may prevent chronic pain and instability  For a Grade I sprain, follow the R I C E  guidelines:    · Rest your ankle by not walking on it   Limit weight bearing  Use crutches if necessary; if there is no fracture you are safe to put some weight on the leg  An ankle brace often helps control swelling and adds stability while the ligaments are healing  · Ice it to keep down the swelling  Don't put ice directly on the skin (use a thin piece of cloth such as a pillow case between the ice bag and the skin) and don't ice more than 20 minutes at a time to avoid frost bite  · Compression can help control swelling as well as immobilize and support your injury  · Elevate the foot by reclining and propping it up above the waist or heart as needed  Swelling usually goes down with a few days  For a Grade II sprain, follow the R I C E  guidelines and allow more time for healing  A doctor may immobilize or splint your sprained ankle  A Grade III sprain puts you at risk for permanent ankle instability  Rarely, surgery may be needed to repair the damage, especially in competitive athletes  For severe ankle sprains, your doctor may also consider treating you with a short leg cast for two to three weeks or a walking boot  People who sprain their ankle repeatedly may also need surgical repair to tighten their ligaments  Rehabilitating your Sprained Ankle  Every ligament injury needs rehabilitation  Otherwise, your sprained ankle might not heal completely and you might re-injure it  All ankle sprains, from mild to severe, require three phases of recovery:    · Phase I includes resting, protecting and reducing swelling of your injured ankle  · Phase II includes restoring your ankle's flexibility, range of motion and strength  · Phase III includes gradually returning to straight-ahead activity and doing maintenance exercises, followed later by more cutting sports such as tennis, basketball or football     Once you can stand on your ankle again, your doctor will prescribe exercise routines to strengthen your muscles and ligaments and increase your flexibility, balance and coordination  Later, you may walk, jog and run figure eights with your ankle taped or in a supportive ankle brace  It's important to complete the rehabilitation program because it makes it less likely that you'll hurt the same ankle again  If you don't complete rehabilitation, you could suffer chronic pain, instability and arthritis in your ankle  If your ankle still hurts, it could mean that the sprained ligament has not healed right, or that some other injury also happened  To prevent future sprained ankles, pay attention to your body's warning signs to slow down when you feel pain or fatigue, and stay in shape with good muscle balance, flexibility and strength in your soft tissues  Ankle Sprain     What is an ankle sprain? An ankle sprain refers to tearing of the ligaments of the ankle  The most common ankle sprain occurs on the lateral or outside part of the ankle  This is an extremely common injury which affects many people during a wide variety of activities  It can happen in the setting of an ankle fracture (i e  when the bones of the ankle also break)  Most commonly, however, it occurs in isolation  What are the symptoms an ankle sprain? Patients report pain after having twisted an ankle  This usually occurs due to an inversion injury, which means the foot rolls underneath the ankle or leg  It commonly occurs during sports  Patients will complain of pain on the outside of their ankle and various degrees of swelling and bleeding under the skin (i e  bruising)  Technically, this bruising is referred to as ecchymosis  Depending on the severity of the sprain, a person may or may not be able to put weight on the foot  What are the risk factors for an ankle sprain? As noted above, these injuries occur when the ankle is twisted underneath the leg, called inversion   Risk factors are those activities, such as basketball and jumping sports, in which an athlete can come down on and turn the ankle or step on an opponent's foot  Some people are predisposed to ankle sprains  In people with a hindfoot varus, which means that the general nature or posture of the heels is slightly turned toward the inside, these injuries are more common  This is because it is easier to turn on the ankle  In those who have had a severe sprain in the past, it is also easier to turn the ankle and cause a new sprain  Therefore, one of the risk factors of spraining the ankle is having instability  Those who have weak muscles, especially those called the peroneals which run along the outside of the ankle, may be more predisposed  Anatomy    There are multiple ligaments in the ankle  Ligaments in general are those structures that connect bone-to-bone  Tendons, on the other hand, connect muscle-to-bone and allow those muscles to exert their force  In the case of an ankle sprain, there are several commonly sprained ligaments  The two most important are the followin The ATFL or anterior talofibular ligament, which connects the talus to the fibula on the outside of the ankle  2 The CFL or calcaneal fibular ligament, which connects the fibula to the calcaneus below  3  Finally, there is a third ligament which is not as commonly torn  It runs more in the back of the ankle and is called the PTFL or posterior talofibular ligament  These must be differentiated from the so-called high ankle sprain ligaments, which are completely different and located higher up the leg  How is an ankle sprain diagnosed? Ankle sprains can be diagnosed fairly easily given that they are common injuries  The location of pain on the outside of the ankle with tenderness and swelling in a patient who has an ankle with inversion is very suggestive  In these patients, normal X-rays also suggest that the bone has not been broken and instead the ankle ligaments have been torn or sprained       It is very important, however, not to simply regard any injury as an ankle sprain because other injuries can occur as well  For example, the peroneal tendons mentioned above can be torn  There can also be fractures in other bones around the ankle including the fifth metatarsal and the anterior process of the calcaneus  In very severe cases, an MRI may be warranted to rule out other problems in the ankle such as damage to the cartilage  An MRI typically is not necessary to diagnose a sprain  What are treatment options? Surgery is not required in the vast majority of ankle sprains  Even in severe sprains, these ligaments will heal without surgery  The grade of the sprain will dictate treatment  Sprains are traditionally classified into several grades  Perhaps more important, however, is the patients ability to bear weight  Those that can bear weight even after the injury are likely to return very quickly to play  Those who cannot walk may need to be immobilized  In general, treatment in the first 48 to 72 hours consists of resting the ankle, icing 20 minutes every two to three hours, compressing with an ACE wrap, and elevating, which means positioning the leg and ankle so that the toes are above the level of patients nose  Those patients who cannot bear weight are better treated in a removable walking boot until they can comfortably bear weight  Physical therapy is a mainstay  Patients should learn to strengthen the muscles around the ankle, particularly the peroneals  An ankle brace can be used in an athlete until a therapist believes that the ankle is strong enough to return to play without it  Surgery is rarely indicated but may be needed in a patient who has cartilage damage or other related injuries  Ligaments are only repaired or strengthened in cases of chronic instability in which the ligaments have healed but not in a strong fashion  How long is recovery? Recovery depends on the severity of the injury   As noted above, for those minor injuries, people can return to their activities in sports within several days  For very severe sprains, it may take longer and up to several weeks  It should be noted that high ankle sprains take considerably longer to heal      Outcomes for ankle sprains are generally quite good  Most patients heal from an ankle sprain and are able to get back to their normal lives, sports and activities  Some people, however, who do not properly rehab their ankle and have a rather severe sprain may go on to have ankle instability  Chronic instability occurs in patients repeatedly spraining the ankle  Such repeated episodes can be dangerous because they can lead to damage within the ankle  These patients should be identified and considered for repair  Potential Complications    Surgery is rarely needed  As noted above, however, an improperly rehabbed ankle may end up having chronic instability  It is important to address this with either therapy or surgery before further damage occurs to the ankle  Frequently Asked Questions    What is a high ankle sprain and is that different from a regular ankle sprain? A high ankle sprain refers to tearing of the ligaments that connect the tibia to the fibula (this connection is also called the syndesmosis)  These are different and much less common than the standard lateral ankle sprains, meaning those that occur on the side of the ankle  Do ankle sprains ever need to be repaired acutely? Ankle sprains rarely, if ever, needed to be treated with surgery  The vast majority simply need to be treated with rest, ice, compression and elevation followed by physical therapy and temporary bracing  I have sprained my ankle many times  Should I be concerned? Yes  The more you sprain an ankle, the greater the chance that problems will develop  For example, turning the ankle can lead to damage to the cartilage inside the ankle joint   You should see your doctor if this is occurring

## 2022-04-27 ENCOUNTER — OFFICE VISIT (OUTPATIENT)
Dept: OBGYN CLINIC | Facility: CLINIC | Age: 37
End: 2022-04-27
Payer: COMMERCIAL

## 2022-04-27 VITALS — WEIGHT: 200 LBS | HEIGHT: 63 IN | BODY MASS INDEX: 35.44 KG/M2

## 2022-04-27 DIAGNOSIS — S93.492D SPRAIN OF ANTERIOR TALOFIBULAR LIGAMENT OF LEFT ANKLE, SUBSEQUENT ENCOUNTER: Primary | ICD-10-CM

## 2022-04-27 PROCEDURE — 99213 OFFICE O/P EST LOW 20 MIN: CPT | Performed by: ORTHOPAEDIC SURGERY

## 2022-04-27 NOTE — PROGRESS NOTES
ELBA Iqbal  Attending, Orthopaedic Surgery  [facility] [address] [facility]      ORTHOPAEDIC FOOT AND ANKLE CLINIC VISIT     Assessment:     Encounter Diagnosis   Name Primary?  Sprain of anterior talofibular ligament of left ankle, subsequent encounter Yes            Plan:   · The patient verbalized understanding of exam findings and treatment plan  We engaged in the shared decision-making process and treatment options were discussed at length with the patient  Surgical and conservative management discussed today along with risks and benefits  · Ly Jones is doing very well, she did not complete any PT but is 80% improved  · She did have an MRI from her PCP, report reviewed, no OCD lesion or peroneal tendon tear  · She will progress to activity as tolerated and she is already back to work  · The abrasion over her ankle is not infected, she will allow the scab to heal and fall off on its own  · We will see her back with any issues, PT was encouraged  Return if symptoms worsen or fail to improve  History of Present Illness:   Chief Complaint:   Chief Complaint   Patient presents with    Left Ankle - Follow-up     Jayesh Hood is a 39 y o  female who is being seen in follow-up for left ankle sprain  When we last saw she we recommended PT and WBAT  Pain has 80% improved  Residual pain is localized at ATFL with minimal radiating and described as burning and mild  She did have an MRI from her PCP showing ankle sprain and tenosynovitis, no OCD lesion or peroneal tendon tear        Pain/symptom timing:  Worse during the day when active  Pain/symptom context:  Worse with activites and work  Pain/symptom modifying factors:  Rest makes better, activities make worse  Pain/symptom associated signs/symptoms: none    Prior treatment   · NSAIDsYes   · Injections No   · Bracing/Orthotics No    · Physical Therapy No     Orthopedic Surgical History:   See Below    Past Medical, Surgical and Social History:  Past Medical History:  has a past medical history of Allergic rhinitis, Asthma, and Psychiatric disorder  Problem List: does not have any pertinent problems on file  Past Surgical History:  has a past surgical history that includes Wrist surgery (Right); Tonsillectomy; and Laparoscopic endometriosis fulguration  Family History: family history includes Arthritis in her paternal grandfather; Diabetes in her mother; Hyperlipidemia in her father; Hypertension in her father and mother; Osteoporosis in her paternal grandfather  Social History:  reports that she has been smoking  She has a 2 00 pack-year smoking history  She has never used smokeless tobacco  She reports that she does not drink alcohol and does not use drugs  Current Medications: has a current medication list which includes the following prescription(s): albuterol, amphetamine-dextroamphetamine, hydroxychloroquine, symbicort, atomoxetine, mometasone-formoterol, naproxen, and vilazodone  Allergies: is allergic to latex, effexor [venlafaxine], and viibryd [vilazodone hcl]  Review of Systems:  General- denies fever/chills  HEENT- denies hearing loss or sore throat  Eyes- denies eye pain or visual disturbances, denies red eyes  Respiratory- denies cough or SOB  Cardio- denies chest pain or palpitations  GI- denies abdominal pain  Endocrine- denies urinary frequency  Urinary- denies pain with urination  Musculoskeletal- Negative except noted above  Skin- denies rashes or wounds  Neurological- denies dizziness or headache  Psychiatric- denies anxiety or difficulty concentrating    Physical Exam:   Ht 5' 3" (1 6 m)   Wt 90 7 kg (200 lb)   BMI 35 43 kg/m²   General/Constitutional: No apparent distress: well-nourished and well developed    Eyes: normal ocular motion  Lymphatic: No appreciable lymphadenopathy  Respiratory: Non-labored breathing  Vascular: No edema, swelling or tenderness, except as noted in detailed exam   Integumentary: No impressive skin lesions present, except as noted in detailed exam   Neuro: No ataxia or tremors noted  Psych: Normal mood and affect, oriented to person, place and time  Appropriate affect  Musculoskeletal: Normal, except as noted in detailed exam and in HPI  Examination    Left    Gait Normal   Musculoskeletal Tender to palpation at ATFL    Skin Small abrasion over lateral ankle, no erythema or drainage, small scab    Nails Normal    Range of Motion  20 degrees dorsiflexion, 40 degrees plantarflexion  Subtalar motion: normal    Stability Stable    Muscle Strength 5/5 tibialis anterior  5/5 gastrocnemius-soleus  5/5 posterior tibialis  5/5 peroneal/eversion strength  5/5 EHL  5/5 FHL    Neurologic Normal    Sensation  Intact to light touch throughout sural, saphenous, superficial peroneal, deep peroneal and medial/lateral plantar nerve distributions  D[place]-Shiloh 5 07 filament (10g) testing was deferred  Cardiovascular Brisk capillary refill < 2 seconds,intact DP and PT pulses    Special Tests None      Imaging Studies:   No new imaging    Scribe Attestation    I,:  Rolanda Camarillo PA-C am acting as a scribe while in the presence of the attending physician :       I,:  Natalie Soni MD personally performed the services described in this documentation    as scribed in my presence :               Albertine Pedro Lachman, MD  Foot & Ankle Surgery   Department of [address] [place] [address]      I personally performed the service  Albertine Pedro Lachman, MD

## 2023-09-25 ENCOUNTER — HOSPITAL ENCOUNTER (INPATIENT)
Facility: HOSPITAL | Age: 38
LOS: 4 days | Discharge: OTHER INSTITUTION | DRG: 205 | End: 2023-09-29
Attending: STUDENT IN AN ORGANIZED HEALTH CARE EDUCATION/TRAINING PROGRAM | Admitting: INTERNAL MEDICINE
Payer: COMMERCIAL

## 2023-09-25 ENCOUNTER — APPOINTMENT (EMERGENCY)
Dept: RADIOLOGY | Facility: HOSPITAL | Age: 38
DRG: 205 | End: 2023-09-25
Payer: COMMERCIAL

## 2023-09-25 ENCOUNTER — APPOINTMENT (EMERGENCY)
Dept: VASCULAR ULTRASOUND | Facility: HOSPITAL | Age: 38
DRG: 205 | End: 2023-09-25
Payer: COMMERCIAL

## 2023-09-25 ENCOUNTER — APPOINTMENT (EMERGENCY)
Dept: CT IMAGING | Facility: HOSPITAL | Age: 38
DRG: 205 | End: 2023-09-25
Payer: COMMERCIAL

## 2023-09-25 DIAGNOSIS — T78.40XA ALLERGY: ICD-10-CM

## 2023-09-25 DIAGNOSIS — Q76.0 THORACIC OUTLET SYNDROME ASSOCIATED WITH CERVICAL RIB: ICD-10-CM

## 2023-09-25 DIAGNOSIS — I50.9 CHF (CONGESTIVE HEART FAILURE) (HCC): ICD-10-CM

## 2023-09-25 DIAGNOSIS — R06.00 DYSPNEA: Primary | ICD-10-CM

## 2023-09-25 DIAGNOSIS — R07.9 CHEST PAIN, UNSPECIFIED TYPE: ICD-10-CM

## 2023-09-25 DIAGNOSIS — G54.0 THORACIC OUTLET SYNDROME ASSOCIATED WITH CERVICAL RIB: ICD-10-CM

## 2023-09-25 DIAGNOSIS — J18.9 BILATERAL PNEUMONIA: ICD-10-CM

## 2023-09-25 PROBLEM — A41.9 SEPSIS (HCC): Status: ACTIVE | Noted: 2023-09-25

## 2023-09-25 PROBLEM — E87.70 FLUID OVERLOAD: Status: ACTIVE | Noted: 2023-09-25

## 2023-09-25 PROBLEM — J45.909 ASTHMA: Status: ACTIVE | Noted: 2023-09-25

## 2023-09-25 PROBLEM — Z92.89 HISTORY OF RECENT HOSPITALIZATION: Status: ACTIVE | Noted: 2023-09-25

## 2023-09-25 PROBLEM — G47.33 OSA (OBSTRUCTIVE SLEEP APNEA): Status: ACTIVE | Noted: 2023-09-25

## 2023-09-25 PROBLEM — J98.11 ATELECTASIS: Status: ACTIVE | Noted: 2023-09-25

## 2023-09-25 LAB
2HR DELTA HS TROPONIN: 0 NG/L
4HR DELTA HS TROPONIN: 0 NG/L
ANION GAP SERPL CALCULATED.3IONS-SCNC: 9 MMOL/L
APTT PPP: 23 SECONDS (ref 23–37)
ATRIAL RATE: 111 BPM
BASOPHILS # BLD AUTO: 0.08 THOUSANDS/ÂΜL (ref 0–0.1)
BASOPHILS NFR BLD AUTO: 0 % (ref 0–1)
BILIRUB UR QL STRIP: NEGATIVE
BNP SERPL-MCNC: 26 PG/ML (ref 0–100)
BUN SERPL-MCNC: 8 MG/DL (ref 5–25)
CALCIUM SERPL-MCNC: 9.1 MG/DL (ref 8.4–10.2)
CARDIAC TROPONIN I PNL SERPL HS: 4 NG/L
CHLORIDE SERPL-SCNC: 104 MMOL/L (ref 96–108)
CLARITY UR: CLEAR
CO2 SERPL-SCNC: 25 MMOL/L (ref 21–32)
COLOR UR: NORMAL
CREAT SERPL-MCNC: 0.68 MG/DL (ref 0.6–1.3)
EOSINOPHIL # BLD AUTO: 1.12 THOUSAND/ÂΜL (ref 0–0.61)
EOSINOPHIL NFR BLD AUTO: 6 % (ref 0–6)
ERYTHROCYTE [DISTWIDTH] IN BLOOD BY AUTOMATED COUNT: 13.8 % (ref 11.6–15.1)
GFR SERPL CREATININE-BSD FRML MDRD: 110 ML/MIN/1.73SQ M
GLUCOSE SERPL-MCNC: 91 MG/DL (ref 65–140)
GLUCOSE UR STRIP-MCNC: NEGATIVE MG/DL
HCT VFR BLD AUTO: 36.1 % (ref 34.8–46.1)
HGB BLD-MCNC: 11.5 G/DL (ref 11.5–15.4)
HGB UR QL STRIP.AUTO: NEGATIVE
IMM GRANULOCYTES # BLD AUTO: 0.38 THOUSAND/UL (ref 0–0.2)
IMM GRANULOCYTES NFR BLD AUTO: 2 % (ref 0–2)
INR PPP: 0.9 (ref 0.84–1.19)
KETONES UR STRIP-MCNC: NEGATIVE MG/DL
LACTATE SERPL-SCNC: 1.2 MMOL/L (ref 0.5–2)
LEUKOCYTE ESTERASE UR QL STRIP: NEGATIVE
LYMPHOCYTES # BLD AUTO: 3 THOUSANDS/ÂΜL (ref 0.6–4.47)
LYMPHOCYTES NFR BLD AUTO: 16 % (ref 14–44)
MCH RBC QN AUTO: 27.7 PG (ref 26.8–34.3)
MCHC RBC AUTO-ENTMCNC: 31.9 G/DL (ref 31.4–37.4)
MCV RBC AUTO: 87 FL (ref 82–98)
MONOCYTES # BLD AUTO: 1.82 THOUSAND/ÂΜL (ref 0.17–1.22)
MONOCYTES NFR BLD AUTO: 10 % (ref 4–12)
NEUTROPHILS # BLD AUTO: 12.26 THOUSANDS/ÂΜL (ref 1.85–7.62)
NEUTS SEG NFR BLD AUTO: 66 % (ref 43–75)
NITRITE UR QL STRIP: NEGATIVE
NRBC BLD AUTO-RTO: 0 /100 WBCS
P AXIS: 62 DEGREES
PH UR STRIP.AUTO: 7 [PH]
PLATELET # BLD AUTO: 311 THOUSANDS/UL (ref 149–390)
PMV BLD AUTO: 9 FL (ref 8.9–12.7)
POTASSIUM SERPL-SCNC: 3.7 MMOL/L (ref 3.5–5.3)
PR INTERVAL: 158 MS
PROT UR STRIP-MCNC: NEGATIVE MG/DL
PROTHROMBIN TIME: 12.7 SECONDS (ref 11.6–14.5)
QRS AXIS: 84 DEGREES
QRSD INTERVAL: 98 MS
QT INTERVAL: 352 MS
QTC INTERVAL: 478 MS
RBC # BLD AUTO: 4.15 MILLION/UL (ref 3.81–5.12)
SODIUM SERPL-SCNC: 138 MMOL/L (ref 135–147)
SP GR UR STRIP.AUTO: 1.01 (ref 1–1.03)
T WAVE AXIS: -18 DEGREES
UROBILINOGEN UR STRIP-ACNC: <2 MG/DL
VENTRICULAR RATE: 111 BPM
WBC # BLD AUTO: 18.66 THOUSAND/UL (ref 4.31–10.16)

## 2023-09-25 PROCEDURE — 83880 ASSAY OF NATRIURETIC PEPTIDE: CPT | Performed by: PHYSICIAN ASSISTANT

## 2023-09-25 PROCEDURE — 99285 EMERGENCY DEPT VISIT HI MDM: CPT | Performed by: PHYSICIAN ASSISTANT

## 2023-09-25 PROCEDURE — 85730 THROMBOPLASTIN TIME PARTIAL: CPT | Performed by: PHYSICIAN ASSISTANT

## 2023-09-25 PROCEDURE — 93970 EXTREMITY STUDY: CPT

## 2023-09-25 PROCEDURE — 84145 PROCALCITONIN (PCT): CPT | Performed by: PHYSICIAN ASSISTANT

## 2023-09-25 PROCEDURE — 85025 COMPLETE CBC W/AUTO DIFF WBC: CPT | Performed by: PHYSICIAN ASSISTANT

## 2023-09-25 PROCEDURE — 93970 EXTREMITY STUDY: CPT | Performed by: SURGERY

## 2023-09-25 PROCEDURE — 93005 ELECTROCARDIOGRAM TRACING: CPT

## 2023-09-25 PROCEDURE — 87449 NOS EACH ORGANISM AG IA: CPT | Performed by: PHYSICIAN ASSISTANT

## 2023-09-25 PROCEDURE — 93010 ELECTROCARDIOGRAM REPORT: CPT | Performed by: INTERNAL MEDICINE

## 2023-09-25 PROCEDURE — 87040 BLOOD CULTURE FOR BACTERIA: CPT | Performed by: PHYSICIAN ASSISTANT

## 2023-09-25 PROCEDURE — 96375 TX/PRO/DX INJ NEW DRUG ADDON: CPT

## 2023-09-25 PROCEDURE — 99223 1ST HOSP IP/OBS HIGH 75: CPT | Performed by: PHYSICIAN ASSISTANT

## 2023-09-25 PROCEDURE — 96376 TX/PRO/DX INJ SAME DRUG ADON: CPT

## 2023-09-25 PROCEDURE — 96365 THER/PROPH/DIAG IV INF INIT: CPT

## 2023-09-25 PROCEDURE — 85610 PROTHROMBIN TIME: CPT | Performed by: PHYSICIAN ASSISTANT

## 2023-09-25 PROCEDURE — 99285 EMERGENCY DEPT VISIT HI MDM: CPT

## 2023-09-25 PROCEDURE — 71045 X-RAY EXAM CHEST 1 VIEW: CPT

## 2023-09-25 PROCEDURE — 71275 CT ANGIOGRAPHY CHEST: CPT

## 2023-09-25 PROCEDURE — 36415 COLL VENOUS BLD VENIPUNCTURE: CPT | Performed by: PHYSICIAN ASSISTANT

## 2023-09-25 PROCEDURE — 84484 ASSAY OF TROPONIN QUANT: CPT | Performed by: PHYSICIAN ASSISTANT

## 2023-09-25 PROCEDURE — 83605 ASSAY OF LACTIC ACID: CPT | Performed by: PHYSICIAN ASSISTANT

## 2023-09-25 PROCEDURE — 80048 BASIC METABOLIC PNL TOTAL CA: CPT | Performed by: PHYSICIAN ASSISTANT

## 2023-09-25 PROCEDURE — 81003 URINALYSIS AUTO W/O SCOPE: CPT | Performed by: PHYSICIAN ASSISTANT

## 2023-09-25 RX ORDER — NICOTINE 21 MG/24HR
1 PATCH, TRANSDERMAL 24 HOURS TRANSDERMAL DAILY
Status: DISCONTINUED | OUTPATIENT
Start: 2023-09-25 | End: 2023-09-26

## 2023-09-25 RX ORDER — LIDOCAINE 40 MG/G
CREAM TOPICAL 4 TIMES DAILY PRN
Status: DISCONTINUED | OUTPATIENT
Start: 2023-09-25 | End: 2023-09-26

## 2023-09-25 RX ORDER — ONDANSETRON 2 MG/ML
4 INJECTION INTRAMUSCULAR; INTRAVENOUS EVERY 6 HOURS PRN
Status: DISCONTINUED | OUTPATIENT
Start: 2023-09-25 | End: 2023-09-29 | Stop reason: HOSPADM

## 2023-09-25 RX ORDER — OXYCODONE HYDROCHLORIDE 10 MG/1
10 TABLET ORAL EVERY 6 HOURS PRN
Status: DISCONTINUED | OUTPATIENT
Start: 2023-09-25 | End: 2023-09-27

## 2023-09-25 RX ORDER — CYCLOBENZAPRINE HCL 10 MG
5 TABLET ORAL 3 TIMES DAILY PRN
Status: DISCONTINUED | OUTPATIENT
Start: 2023-09-25 | End: 2023-09-27

## 2023-09-25 RX ORDER — HYDROXYCHLOROQUINE SULFATE 200 MG/1
200 TABLET, FILM COATED ORAL 2 TIMES DAILY
Status: DISCONTINUED | OUTPATIENT
Start: 2023-09-25 | End: 2023-09-29 | Stop reason: HOSPADM

## 2023-09-25 RX ORDER — ONDANSETRON 2 MG/ML
4 INJECTION INTRAMUSCULAR; INTRAVENOUS ONCE
Status: COMPLETED | OUTPATIENT
Start: 2023-09-25 | End: 2023-09-25

## 2023-09-25 RX ORDER — GUAIFENESIN/DEXTROMETHORPHAN 100-10MG/5
10 SYRUP ORAL EVERY 4 HOURS PRN
Status: DISCONTINUED | OUTPATIENT
Start: 2023-09-25 | End: 2023-09-29 | Stop reason: HOSPADM

## 2023-09-25 RX ORDER — MORPHINE SULFATE 10 MG/ML
6 INJECTION, SOLUTION INTRAMUSCULAR; INTRAVENOUS ONCE
Status: COMPLETED | OUTPATIENT
Start: 2023-09-25 | End: 2023-09-25

## 2023-09-25 RX ORDER — ENOXAPARIN SODIUM 100 MG/ML
40 INJECTION SUBCUTANEOUS EVERY 12 HOURS SCHEDULED
Status: DISCONTINUED | OUTPATIENT
Start: 2023-09-25 | End: 2023-09-29 | Stop reason: HOSPADM

## 2023-09-25 RX ORDER — ACETAMINOPHEN 325 MG/1
650 TABLET ORAL EVERY 6 HOURS PRN
Status: DISCONTINUED | OUTPATIENT
Start: 2023-09-25 | End: 2023-09-29 | Stop reason: HOSPADM

## 2023-09-25 RX ORDER — GABAPENTIN 300 MG/1
300 CAPSULE ORAL ONCE
Status: COMPLETED | OUTPATIENT
Start: 2023-09-25 | End: 2023-09-25

## 2023-09-25 RX ORDER — GABAPENTIN 300 MG/1
300 CAPSULE ORAL 3 TIMES DAILY
Status: DISCONTINUED | OUTPATIENT
Start: 2023-09-26 | End: 2023-09-29 | Stop reason: HOSPADM

## 2023-09-25 RX ORDER — BUDESONIDE AND FORMOTEROL FUMARATE DIHYDRATE 160; 4.5 UG/1; UG/1
2 AEROSOL RESPIRATORY (INHALATION) 2 TIMES DAILY
Status: DISCONTINUED | OUTPATIENT
Start: 2023-09-25 | End: 2023-09-29 | Stop reason: HOSPADM

## 2023-09-25 RX ORDER — LANOLIN ALCOHOL/MO/W.PET/CERES
6 CREAM (GRAM) TOPICAL
Status: DISCONTINUED | OUTPATIENT
Start: 2023-09-25 | End: 2023-09-29 | Stop reason: HOSPADM

## 2023-09-25 RX ORDER — MORPHINE SULFATE 4 MG/ML
4 INJECTION, SOLUTION INTRAMUSCULAR; INTRAVENOUS ONCE
Status: COMPLETED | OUTPATIENT
Start: 2023-09-25 | End: 2023-09-25

## 2023-09-25 RX ORDER — MONTELUKAST SODIUM 10 MG/1
10 TABLET ORAL
Status: DISCONTINUED | OUTPATIENT
Start: 2023-09-25 | End: 2023-09-26

## 2023-09-25 RX ORDER — LORATADINE 10 MG/1
10 TABLET ORAL
Status: DISCONTINUED | OUTPATIENT
Start: 2023-09-25 | End: 2023-09-26

## 2023-09-25 RX ORDER — MAGNESIUM HYDROXIDE/ALUMINUM HYDROXICE/SIMETHICONE 120; 1200; 1200 MG/30ML; MG/30ML; MG/30ML
30 SUSPENSION ORAL EVERY 6 HOURS PRN
Status: DISCONTINUED | OUTPATIENT
Start: 2023-09-25 | End: 2023-09-29 | Stop reason: HOSPADM

## 2023-09-25 RX ORDER — DIPHENHYDRAMINE HYDROCHLORIDE 50 MG/ML
25 INJECTION INTRAMUSCULAR; INTRAVENOUS ONCE
Status: COMPLETED | OUTPATIENT
Start: 2023-09-25 | End: 2023-09-25

## 2023-09-25 RX ORDER — DIPHENHYDRAMINE HCL 25 MG
25 TABLET ORAL EVERY 6 HOURS PRN
Status: DISCONTINUED | OUTPATIENT
Start: 2023-09-25 | End: 2023-09-27

## 2023-09-25 RX ORDER — ACETAMINOPHEN 325 MG/1
650 TABLET ORAL EVERY 6 HOURS PRN
Status: DISCONTINUED | OUTPATIENT
Start: 2023-09-25 | End: 2023-09-25

## 2023-09-25 RX ORDER — ALBUTEROL SULFATE 90 UG/1
2 AEROSOL, METERED RESPIRATORY (INHALATION) EVERY 6 HOURS PRN
Status: DISCONTINUED | OUTPATIENT
Start: 2023-09-25 | End: 2023-09-29 | Stop reason: HOSPADM

## 2023-09-25 RX ORDER — DEXTROAMPHETAMINE SACCHARATE, AMPHETAMINE ASPARTATE, DEXTROAMPHETAMINE SULFATE AND AMPHETAMINE SULFATE 2.5; 2.5; 2.5; 2.5 MG/1; MG/1; MG/1; MG/1
10 TABLET ORAL
Status: DISCONTINUED | OUTPATIENT
Start: 2023-09-26 | End: 2023-09-29 | Stop reason: HOSPADM

## 2023-09-25 RX ORDER — OXYCODONE HYDROCHLORIDE 5 MG/1
5 TABLET ORAL EVERY 6 HOURS PRN
Status: DISCONTINUED | OUTPATIENT
Start: 2023-09-25 | End: 2023-09-27

## 2023-09-25 RX ORDER — AMOXICILLIN 250 MG
1 CAPSULE ORAL
Status: DISCONTINUED | OUTPATIENT
Start: 2023-09-25 | End: 2023-09-26

## 2023-09-25 RX ORDER — FUROSEMIDE 10 MG/ML
40 INJECTION INTRAMUSCULAR; INTRAVENOUS ONCE
Status: COMPLETED | OUTPATIENT
Start: 2023-09-25 | End: 2023-09-25

## 2023-09-25 RX ADMIN — ONDANSETRON 4 MG: 2 INJECTION INTRAMUSCULAR; INTRAVENOUS at 14:00

## 2023-09-25 RX ADMIN — MONTELUKAST 10 MG: 10 TABLET, FILM COATED ORAL at 22:42

## 2023-09-25 RX ADMIN — BUDESONIDE AND FORMOTEROL FUMARATE DIHYDRATE 2 PUFF: 160; 4.5 AEROSOL RESPIRATORY (INHALATION) at 23:29

## 2023-09-25 RX ADMIN — OXYCODONE HYDROCHLORIDE 10 MG: 10 TABLET ORAL at 22:42

## 2023-09-25 RX ADMIN — CYCLOBENZAPRINE 5 MG: 10 TABLET, FILM COATED ORAL at 22:44

## 2023-09-25 RX ADMIN — CEFTRIAXONE SODIUM 2000 MG: 10 INJECTION, POWDER, FOR SOLUTION INTRAVENOUS at 15:55

## 2023-09-25 RX ADMIN — MORPHINE SULFATE 6 MG: 10 INJECTION INTRAVENOUS at 14:05

## 2023-09-25 RX ADMIN — HYDROXYCHLOROQUINE SULFATE 200 MG: 200 TABLET, FILM COATED ORAL at 23:29

## 2023-09-25 RX ADMIN — ENOXAPARIN SODIUM 40 MG: 40 INJECTION SUBCUTANEOUS at 22:41

## 2023-09-25 RX ADMIN — SENNOSIDES AND DOCUSATE SODIUM 1 TABLET: 50; 8.6 TABLET ORAL at 22:43

## 2023-09-25 RX ADMIN — AZITHROMYCIN MONOHYDRATE 500 MG: 500 INJECTION, POWDER, LYOPHILIZED, FOR SOLUTION INTRAVENOUS at 17:09

## 2023-09-25 RX ADMIN — FUROSEMIDE 40 MG: 10 INJECTION, SOLUTION INTRAMUSCULAR; INTRAVENOUS at 15:22

## 2023-09-25 RX ADMIN — LORATADINE 10 MG: 10 TABLET ORAL at 22:44

## 2023-09-25 RX ADMIN — GABAPENTIN 300 MG: 300 CAPSULE ORAL at 19:59

## 2023-09-25 RX ADMIN — DIPHENHYDRAMINE HYDROCHLORIDE 25 MG: 50 INJECTION INTRAMUSCULAR; INTRAVENOUS at 14:56

## 2023-09-25 RX ADMIN — MORPHINE SULFATE 4 MG: 4 INJECTION INTRAVENOUS at 15:53

## 2023-09-25 RX ADMIN — IOHEXOL 100 ML: 350 INJECTION, SOLUTION INTRAVENOUS at 15:07

## 2023-09-25 RX ADMIN — GUAIFENESIN AND DEXTROMETHORPHAN 10 ML: 100; 10 SYRUP ORAL at 22:42

## 2023-09-25 NOTE — SEPSIS NOTE
Sepsis Note   Kristal Dumont 43 y.o. female MRN: [id number]  Unit/Bed#: ED-33 Encounter: [id number]       Initial Sepsis Screening     Row Name 09/25/23 154                Is the patient's history suggestive of a new or worsening infection? Yes (Proceed)  -JG        Suspected source of infection suspect infection, source unknown  -JG        Indicate SIRS criteria Tachycardia > 90 bpm;Leukocytosis (WBC > 09699 IJL) OR Leukopenia (WBC <4000 IJL) OR Bandemia (WBC >10% bands)  -JG        Are two or more of the above signs & symptoms of infection both present and new to the patient? Yes (Proceed)  -JG        Assess for evidence of organ dysfunction: Are any of the below criteria present within 6 hours of suspected infection and SIRS criteria that are NOT considered to be chronic conditions? --              User Key  (r) = Recorded By, (t) = Taken By, (c) = Cosigned By    [address] [place] [address] Name Provider Type    Kailee Daniels PA-C Physician Assistant                    Body mass index is 43.39 kg/m².   Wt Readings from Last 1 Encounters:   09/25/23 111 kg (244 lb 14.9 oz)     IBW (Ideal Body Weight): 52.4 kg    Ideal body weight: 52.4 kg (115 lb 8.3 oz)  Adjusted ideal body weight: 75.9 kg (167 lb 4.6 oz)

## 2023-09-25 NOTE — ED PROVIDER NOTES
History  Chief Complaint   Patient presents with   • Shortness of Breath     Pt arrives via ems from home, SOB, possible allergic reaction, hx asthma, given duo neb and 50 of benadryl via ems, dry mouth, recent surgery in left collar bone       History provided by:  Patient  Shortness of Breath  Severity:  Moderate  Onset quality:  Sudden  Timing:  Constant  Progression:  Waxing and waning  Chronicity:  New  Context: activity    Context: not animal exposure, not emotional upset, not fumes, not known allergens, not occupational exposure, not pollens, not smoke exposure, not strong odors, not URI and not weather changes    Relieved by:  Nothing  Worsened by:  Nothing  Ineffective treatments:  None tried  Associated symptoms: cough and sputum production    Associated symptoms: no abdominal pain, no chest pain, no claudication, no diaphoresis, no ear pain, no fever, no headaches, no hemoptysis, no neck pain, no PND, no rash, no sore throat, no syncope, no swollen glands and no vomiting    Risk factors: prolonged immobilization and recent surgery    Risk factors: no recent alcohol use, no family hx of DVT, no hx of cancer, no hx of PE/DVT, no obesity, no oral contraceptive use and no tobacco use        Prior to Admission Medications   Prescriptions Last Dose Informant Patient Reported? Taking?    Mometasone Furo-Formoterol Fum (DULERA) 100-5 MCG/ACT AERO  Self Yes No   Sig: Inhale 1 puff daily   Patient not taking: Reported on 3/9/2022    Symbicort 160-4.5 MCG/ACT inhaler  Self Yes No   Sig: TAKE 2 PUFFS BY MOUTH TWICE A DAY IN THE MORNING AND IN THE EVENING   albuterol (PROVENTIL HFA,VENTOLIN HFA) 90 mcg/act inhaler  Self Yes No   Sig: Inhale 2 puffs every 6 (six) hours as needed for wheezing   amphetamine-dextroamphetamine (ADDERALL XR) 20 MG 24 hr capsule  Self Yes No   Sig: Take 20 mg by mouth every morning   atomoxetine (STRATTERA) 10 MG capsule  Self Yes No   Sig: Take 10 mg by mouth daily Patient is unsure of the dosage   Patient not taking: Reported on 3/9/2022    hydroxychloroquine (PLAQUENIL) 200 mg tablet  Self Yes No   Sig: TAKE 1 TABLET (200 MG TOTAL) BY MOUTH 2 TIMES A DAY. naproxen (NAPROSYN) 500 mg tablet  Self No No   Sig: Take 1 tablet (500 mg total) by mouth 2 (two) times a day with meals for 10 days   vilazodone (VIIBRYD) 20 mg tablet  Self Yes No   Sig: Take 40 mg by mouth daily with breakfast   Patient not taking: Reported on 3/9/2022       Facility-Administered Medications: None       Past Medical History:   Diagnosis Date   • Allergic rhinitis    • Asthma    • Psychiatric disorder        Past Surgical History:   Procedure Laterality Date   • LAPAROSCOPIC ENDOMETRIOSIS FULGURATION     • TONSILLECTOMY     • WRIST SURGERY Right     wrist shattered / bone graft       Family History   Problem Relation Age of Onset   • Diabetes Mother    • Hypertension Mother    • Hyperlipidemia Father    • Hypertension Father    • Arthritis Paternal Grandfather    • Osteoporosis Paternal Grandfather      I have reviewed and agree with the history as documented. E-Cigarette/Vaping   • E-Cigarette Use Never User      E-Cigarette/Vaping Substances   • Nicotine No    • THC No    • CBD No    • Flavoring No    • Other No    • Unknown No      Social History     Tobacco Use   • Smoking status: Every Day     Packs/day: 0.25     Years: 8.00     Total pack years: 2.00     Types: Cigarettes   • Smokeless tobacco: Never   • Tobacco comments:     patient on chantix to quit   Vaping Use   • Vaping Use: Never used   Substance Use Topics   • Alcohol use: No   • Drug use: No       Review of Systems   Constitutional: Positive for activity change. Negative for appetite change, chills, diaphoresis, fatigue and fever. HENT: Negative for congestion, ear discharge, ear pain, mouth sores, postnasal drip, rhinorrhea and sore throat. Eyes: Negative for pain, discharge, redness and itching.    Respiratory: Positive for cough, sputum production and shortness of breath. Negative for hemoptysis and chest tightness. Cardiovascular: Positive for leg swelling. Negative for chest pain, palpitations, claudication, syncope and PND. Gastrointestinal: Negative for abdominal pain and vomiting. Genitourinary: Negative for dysuria, frequency and urgency. Musculoskeletal: Negative for neck pain. Skin: Positive for wound. Negative for color change and rash. Neurological: Negative for headaches. Psychiatric/Behavioral: Negative for confusion. All other systems reviewed and are negative. Physical Exam  Physical Exam  Vitals and nursing note reviewed. Constitutional:       General: She is not in acute distress. Appearance: She is well-developed. She is not ill-appearing, toxic-appearing or diaphoretic. HENT:      Head: Normocephalic. Mouth/Throat:      Pharynx: No pharyngeal swelling or oropharyngeal exudate. Neck:      Thyroid: No thyromegaly. Vascular: No hepatojugular reflux. Trachea: No tracheal deviation. Cardiovascular:      Rate and Rhythm: Regular rhythm. Tachycardia present. Heart sounds: No murmur heard. Pulmonary:      Effort: Pulmonary effort is normal.   Abdominal:      Palpations: Abdomen is soft. There is no hepatomegaly or mass. Tenderness: There is no guarding or rebound. Musculoskeletal:      Cervical back: Neck supple. Right lower leg: Edema present. Left lower leg: Edema present. Comments: +2 pitting edema both legs and 1+ edema left arm. Lymphadenopathy:      Cervical: No cervical adenopathy. Skin:     General: Skin is warm. Capillary Refill: Capillary refill takes less than 2 seconds. Neurological:      Mental Status: She is alert and oriented to person, place, and time.    Psychiatric:         Mood and Affect: Mood normal.         Behavior: Behavior normal.         Vital Signs  ED Triage Vitals   Temperature Pulse Respirations Blood Pressure SpO2   09/25/23 1213 09/25/23 1158 09/25/23 1158 09/25/23 1158 09/25/23 1158   98.2 °F (36.8 °C) (!) 119 22 144/76 99 %      Temp Source Heart Rate Source Patient Position - Orthostatic VS BP Location FiO2 (%)   09/25/23 1213 09/25/23 1158 09/25/23 1158 09/25/23 1158 --   Oral Monitor Sitting Right arm       Pain Score       09/25/23 1158       8           Vitals:    09/25/23 1158 09/25/23 1517 09/25/23 1551 09/25/23 1625   BP: 144/76 164/70 138/74 152/80   Pulse: (!) 119 (!) 116 (!) 107 (!) 117   Patient Position - Orthostatic VS: Sitting Sitting Sitting Sitting         Visual Acuity      ED Medications  Medications   azithromycin (ZITHROMAX) 500 mg in sodium chloride 0.9% 250mL IVPB 500 mg (500 mg Intravenous New Bag 9/25/23 1709)   gabapentin (NEURONTIN) capsule 300 mg (has no administration in time range)   ceftriaxone (ROCEPHIN) 2 g/50 mL in dextrose IVPB (0 mg Intravenous Stopped 9/25/23 1625)   morphine injection 6 mg (6 mg Intravenous Given 9/25/23 1405)   ondansetron (ZOFRAN) injection 4 mg (4 mg Intravenous Given 9/25/23 1400)   furosemide (LASIX) injection 40 mg (40 mg Intravenous Given 9/25/23 1522)   diphenhydrAMINE (BENADRYL) injection 25 mg (25 mg Intravenous Given 9/25/23 1456)   iohexol (OMNIPAQUE) 350 MG/ML injection (MULTI-DOSE) 100 mL (100 mL Intravenous Given 9/25/23 1507)   morphine injection 4 mg (4 mg Intravenous Given 9/25/23 1553)       Diagnostic Studies  Results Reviewed     Procedure Component Value Units Date/Time    HS Troponin I 4hr [[id number]] Collected: 09/25/23 1700    Lab Status:  In process Specimen: Blood from Arm, Right Updated: 09/25/23 1709    HS Troponin I 2hr [[id number]]  (Normal) Collected: 09/25/23 1522    Lab Status: Final result Specimen: Blood from Arm, Right Updated: 09/25/23 1616     hs TnI 2hr 4 ng/L      Delta 2hr hsTnI 0 ng/L     Lactic acid, plasma (w/reflex if result > 2.0) [[id number]]  (Normal) Collected: 09/25/23 1447    Lab Status: Final result Specimen: Blood from Arm, Right Updated: 09/25/23 1513     LACTIC ACID 1.2 mmol/L     Narrative:      Result may be elevated if tourniquet was used during collection. Blood culture [[id number]] Collected: 09/25/23 1447    Lab Status: In process Specimen: Blood from Arm, Right Updated: 09/25/23 1452    Blood culture [[id number]] Collected: 09/25/23 1359    Lab Status:  In process Specimen: Blood from Arm, Right Updated: 09/25/23 1427    UA w Reflex to Microscopic w Reflex to Culture [[id number]] Collected: 09/25/23 1337    Lab Status: Final result Specimen: Urine, Clean Catch Updated: 09/25/23 1355     Color, UA Light Yellow     Clarity, UA Clear     Specific Gravity, UA 1.011     pH, UA 7.0     Leukocytes, UA Negative     Nitrite, UA Negative     Protein, UA Negative mg/dl      Glucose, UA Negative mg/dl      Ketones, UA Negative mg/dl      Urobilinogen, UA <2.0 mg/dl      Bilirubin, UA Negative     Occult Blood, UA Negative    B-Type Natriuretic Peptide(BNP) [[id number]]  (Normal) Collected: 09/25/23 1251    Lab Status: Final result Specimen: Blood Updated: 09/25/23 1351     BNP 26 pg/mL     HS Troponin 0hr (reflex protocol) [[id number]]  (Normal) Collected: 09/25/23 1251    Lab Status: Final result Specimen: Blood from Arm, Left Updated: 09/25/23 1325     hs TnI 0hr 4 ng/L     Protime-INR [[id number]]  (Normal) Collected: 09/25/23 1251    Lab Status: Final result Specimen: Blood from Arm, Left Updated: 09/25/23 1321     Protime 12.7 seconds      INR 0.90    APTT [[id number]]  (Normal) Collected: 09/25/23 1251    Lab Status: Final result Specimen: Blood from Arm, Left Updated: 09/25/23 1321     PTT 23 seconds     Basic metabolic panel [[id number]] Collected: 09/25/23 1251    Lab Status: Final result Specimen: Blood from Arm, Left Updated: 09/25/23 1319     Sodium 138 mmol/L      Potassium 3.7 mmol/L      Chloride 104 mmol/L      CO2 25 mmol/L      ANION GAP 9 mmol/L      BUN 8 mg/dL      Creatinine 0.68 mg/dL      Glucose 91 mg/dL      Calcium 9.1 mg/dL      eGFR 110 ml/min/1.73sq m     Narrative:      [facility] Disease (CKD):   •  Stage 1 with normal or high GFR (GFR > 90 mL/min/1.73 square meters)  •  Stage 2 Mild CKD (GFR = 60-89 mL/min/1.73 square meters)  •  Stage 3A Moderate CKD (GFR = 45-59 mL/min/1.73 square meters)  •  Stage 3B Moderate CKD (GFR = 30-44 mL/min/1.73 square meters)  •  Stage 4 Severe CKD (GFR = 15-29 mL/min/1.73 square meters)  •  Stage 5 End Stage CKD (GFR <15 mL/min/1.73 square meters)  Note: GFR calculation is accurate only with a steady state creatinine    CBC and differential [[id number]]  (Abnormal) Collected: 09/25/23 1251    Lab Status: Final result Specimen: Blood from Arm, Left Updated: 09/25/23 1305     WBC 18.66 Thousand/uL      RBC 4.15 Million/uL      Hemoglobin 11.5 g/dL      Hematocrit 36.1 %      MCV 87 fL      MCH 27.7 pg      MCHC 31.9 g/dL      RDW 13.8 %      MPV 9.0 fL      Platelets 153 Thousands/uL      nRBC 0 /100 WBCs      Neutrophils Relative 66 %      Immat GRANS % 2 %      Lymphocytes Relative 16 %      Monocytes Relative 10 %      Eosinophils Relative 6 %      Basophils Relative 0 %      Neutrophils Absolute 12.26 Thousands/µL      Immature Grans Absolute 0.38 Thousand/uL      Lymphocytes Absolute 3.00 Thousands/µL      Monocytes Absolute 1.82 Thousand/µL      Eosinophils Absolute 1.12 Thousand/µL      Basophils Absolute 0.08 Thousands/µL                  CTA ED chest PE Study   ED Interpretation by Krystian Orantes PA-C (09/25 0696)   CTA ED chest PE Study  Status: Final result    PACS Images     Show images for CTA ED chest PE Study  Study Result    Narrative & Impression  CTA - CHEST WITH IV CONTRAST - PULMONARY ANGIOGRAM     INDICATION:   dyspnea.  Recent surgery left clavicle.     COMPARISON: December 20, 2016     TECHNIQUE: CTA examination of the chest was performed using angiographic technique according to a protocol specifically tailored to evaluate for pulmonary embolism. Multiplanar 2D reformatted images were created from the source data. In addition,   coronal 3D MIP postprocessing was performed on the acquisition scanner.     Radiation dose length product (DLP) for this visit:  693 mGy-cm . This examination, like all CT scans performed in the [facility], was performed utilizing techniques to minimize radiation dose exposure, including the use of iterative   reconstruction and automated exposure control.     IV Contrast:  100 mL of iohexol (OMNIPAQUE)     FINDINGS:     PULMONARY ARTERIAL    TREE:  No pulmonary embolus is seen.     LUNGS:   There is no tracheal or endobronchial lesion.     Ill-defined groundglass opacity in the right lung apex. Additional ill-defined groundglass opacities in the periphery of the right upper lobe and right lower lobe. No consolidation.     PLEURA: Small left pleural effusion. Left apical and basilar atelectasis. Scattered scarring     HEART/GREAT VESSELS:  Unremarkable for patient's age. No thoracic aortic aneurysm.     MEDIASTINUM AND CORNELL:  Unremarkable.     CHEST WALL AND LOWER NECK:   Postop changes in the region of the left clavicle compatible with known recent surgery.     VISUALIZED STRUCTURES IN THE UPPER ABDOMEN: Fatty infiltration of liver. Contracted gallbladder. Unremarkable spleen with accessory splenule. Low-lying right kidney not visualized. Left kidney unremarkable. Adrenals unremarkable. Pancreas unremarkable. Atypical spigelian hernia extending between transversalis muscles and lower ribs only partially imaged.     OSS   EOUS STRUCTURES:  No acute fracture or destructive osseous lesion.        IMPRESSION:  No central pulmonary embolus. No right heart strain. Groundglass opacities in the periphery of the right lung most pronounced at the right lung apex (no COVID detected).  Atelectatic changes both lungs most pronounced at the left lung base.                 Workstation performed: [id number]       Imaging    CTA ED chest PE Study (Order: [id number]) - 9/25/2023    Result History    CTA ED chest PE Study (Order #[id number]) on 9/25/2023 - Order Result History Report    Order Report    Order Details  Order Questions    Question Answer Comment  Does the patient have renal dysfunction? (Based on GFR Value) > 60 or young healthy patient w/ no clinical reason to suspect renal dysfunction   Proceed to CT PE Evaluation. Order can be completed and signed   Pregnant? No   What is the patient's sedation requirement? If Medication for Claustrophobia is selected, order medication at this point. No Sedation   Contras   t Information: IV ONLY   Note: If this is a PO only order, please change to a CT ABDOMEN PELVIS WO  Did the patient ever have a reaction to x-ray dye? If yes, please verify the type of allergy and order the contrast allergy prep. No   Note: If yes, please verify the type of allergy and order the contrast allergy prep. Did you complete and document the Wells' Score? If "No", Please use the link below to complete the score Yes 4.5  Exam reason dyspnea   Note: Enter reason for exam  Decision Support Exception Emergency Medical Condition (MA)          Result Information    Status Priority Source  Final result (9/25/2023 1632) STAT     Other Results from 9/25/2023     HS Troponin I 2hr  Final result 9/25/2023   Blood culture  In process 9/25/2023   Lactic acid, plasma (w/reflex if result > 2.0)  Final result 9/25/2023   Blood culture  In process 9/25/2023   UA w Reflex to Microscopic w Reflex to Culture  Final result 9/25/2023   CBC and differential  Final result 9/25/2023   Protime-INR     Final result 9/25/2023   APTT  Final result 2/79/7959   Basic metabolic panel  Final result 9/25/2023   HS Troponin 0hr (reflex protocol)  Final result 9/25/2023   B-Type Natriuretic Peptide(BNP)  Final result 9/25/2023   important suggestion  Warning:  Additional results from 9/25/2023 are available but are not displayed in this report. Reason for Exam    dyspnea        CTA ED chest PE Study: Patient Communication    Add Comments  Not seen        Signed by    Signed Date/Time  Phone Pager  Marta Almaraz 9/25/2023 16:32 [phone]       Exam Information    Status Exam Begun  Exam Ended  Performing Tech  Final [99] 9/25/2023 15:01 9/25/2023 15:09 [facility]      Screening Form Questions    No questions have been answered for this form. External Results Report      Open External Results Report     Encounter      View Encounter                 Patient Care Timeline    No data selected in time range    Pre-op Summary      Pre-op            Recovery Summary      Recovery                Routing History    None            Final Result by Marta Almaraz DO (09/25 1632)   No central pulmonary embolus. No right heart strain. Groundglass opacities in the periphery of the right lung most pronounced at the right lung apex (no COVID detected). Atelectatic changes both lungs most pronounced at the left lung base. Workstation performed: [id number]         VAS lower limb venous duplex study, complete bilateral   Final Result by Prashant Brush MD (09/25 4428)      XR chest 1 view portable   ED Interpretation by Justin Del Castillo PA-C (09/25 1304)   For inspiration, mild CHF, cardiomegaly, questionable left pleural effusion      Final Result by Iftikhar Scales MD (09/25 3572)      Bilateral  opacities could represent pulmonary edema or atypical pneumonia in the appropriate clinical setting. Left-sided pleural effusion                  Resident: Abbey Wick I, the attending radiologist, have reviewed the images and agree with the final report above.       Workstation performed: [id number]                    Procedures  ECG 12 Lead Documentation Only    Date/Time: 9/25/2023 1:37 PM    Performed by: Justin Del Castillo PA-C  Authorized by: Justin Del Castillo PA-C Indications / Diagnosis:  Dyspnea  ECG reviewed by me, the ED Provider: yes    Patient location:  ED  Previous ECG:     Previous ECG:  Compared to current    Comparison ECG info:  9/15/18    Similarity:  Changes noted    Comparison to cardiac monitor: Yes    Interpretation:     Interpretation: abnormal    Rate:     ECG rate:  119    ECG rate assessment: tachycardic    Rhythm:     Rhythm: sinus tachycardia    Ectopy:     Ectopy: none    QRS:     QRS axis:  Normal    QRS intervals:  Normal  Conduction:     Conduction: normal    ST segments:     ST segments:  Abnormal    Depression:  II, III and aVF  T waves:     T waves: inverted      Inverted:  II, III and aVF  Comments:      New T wave inversion in the inferior leads suggesting ischemia. Not present on previous tracing. Independently interpreted by me and Dr. Cierra Mendez             ED Course  ED Course as of 09/25/23 1732   Mon Sep 25, 2023   1515 Negative for DVT in both lower legs                            Initial Sepsis Screening     [address] Name 09/25/23 1542                Is the patient's history suggestive of a new or worsening infection? Yes (Proceed)  -JG        Suspected source of infection suspect infection, source unknown  -JG        Indicate SIRS criteria Tachycardia > 90 bpm;Leukocytosis (WBC > 87271 IJL) OR Leukopenia (WBC <4000 IJL) OR Bandemia (WBC >10% bands)  -JG        Are two or more of the above signs & symptoms of infection both present and new to the patient? Yes (Proceed)  -JG        Assess for evidence of organ dysfunction: Are any of the below criteria present within 6 hours of suspected infection and SIRS criteria that are NOT considered to be chronic conditions? --              User Key  (r) = Recorded By, (t) = Taken By, (c) = Cosigned By    [address] [place] [address] Name Provider Type    Carlos Alberto Montoya PA-C Physician Assistant                SUKHDEEP 22yo+    Flowsheet Row Most Recent Value   Initial Alcohol Screen: US AUDIT-C     1.  How often do you have a drink containing alcohol? 0 Filed at: 09/25/2023 1628   2. How many drinks containing alcohol do you have on a typical day you are drinking? 0 Filed at: 09/25/2023 1628   3b. FEMALE Any Age, or MALE 65+: How often do you have 4 or more drinks on one occassion? 0 Filed at: 09/25/2023 1628   Audit-C Score 0 Filed at: 09/25/2023 1628   KATHY: How many times in the past year have you. .. Used an illegal drug or used a prescription medication for non-medical reasons? Never Filed at: 09/25/2023 6100                    Medical Decision Making  Patient presents emergency room complaining of shortness of breath. She is status post thoracic outlet syndrome on 9/19/2023. She had an allergic reaction postoperatively and was placed in the intensive care unit. Her allergic reaction was to IV Toradol. She was discharged on Friday, 3 days ago. She complains of tongue swelling and airway issues that caused her to be admitted to the intensive care unit secondary to Toradol. She states that she had been doing better and she was discharged home. She has not had any problems with that since the reaction initially on Tuesday. She has been doing well at home until last night when she noticed that she was unable to lay flat secondary to increasing shortness of breath. She noticed that when she ambulated up and down the stairs that she was more dyspneic than normal.  She does complain of palpitations. She complains of shortness of breath with activity. Her shortness of breath is unrelieved with her nebulizers that she has been giving herself at home for her asthma. She denies any chest pain. She denies any abdominal pain. She does wear CPAP for sleep apnea and has been wearing it at night to sleep. She states that she has not been sleeping as she is having difficulty laying flat because of the shortness of breath. She has had a 14 pound weight gain since the onset of her surgery.   She complains of swelling in both legs and her left upper extremity. She is unsure whether she has been running fevers as she took her temperature last night and one measurement was 99.3 over her frontal area and a repeat under her arm was 100.3 axillary. She complains of a productive cough with occasional yellow sputum. She denies nasal congestion or postnasal drip. She denies a sore throat. She denies any calf tenderness with ambulation. She states when she was diagnosed with her thoracic outlet syndrome that her left arm had a bluish discoloration and that is how they picked it up. She does have it bilaterally. Her surgery was performed at the [address] [place] [address]. She does complain of urinary hesitancy and frequency but no dysuria or urgency. Past medical history is positive for allergic rhinitis, asthma, psychiatric disorder, thoracic outlet syndrome    Physical exam this 76-year-old female is alert and oriented x3. She is in no acute distress. Her wound is healing over the anterior aspect of her left shoulder. There is mild erythema along the edges of the wound consistent with the recent surgery. There is no active drainage present. The area is tender upon palpation. Lungs are clear to auscultation. Heart is a regular rhythm with a tachycardic rate of 110. Abdomen is soft nondistended nontender without mass or hepatosplenomegaly. Patient does have 2+ pretibial edema. There is 1+ edema of the left upper extremity. There are palpable pulses at the wrist that are +2. Capillary refills less than 3 seconds. Differential diagnosis includes but is not limited to acute pulmonary embolism, aortic dissection, pneumonia, pulmonary edema, CHF, DVT, wound infection, sepsis. Ultrasound Dopplers of both lower extremities are negative for DVT    Laboratory tests were taken and were reviewed. White count was elevated to 18,000. This may be secondary to recent steroid use for allergic reaction to Toradol.     EKG shows inverted T waves in the inferior leads with a evidence of a right heart strain. These are new from previous tracing from 2018    Chest x-ray demonstrates pulmonary edema with cardiomegaly and poor inspiration. There is a left pleural effusion seen. CTA of the chest is negative for pulmonary embolism. Positive for bilateral atelectasis and groundglass abnormality concerning for early pneumonia. Impression  Dyspnea  Bilateral pneumonia  Chest pain  CHF  Thoracic outlet syndrome/thoracic outlet decompression with excision of the cervical rib- left    Plan-  Admission  Patient was covered with Rocephin for SIRS criteria. She did not meet acute sepsis. She was then given a dose of the Zithromax to cover her for pneumonia. Did not intravenously hydrate her as she did have pulmonary edema initially on her x-ray. Bilateral pneumonia: acute illness or injury  Chest pain, unspecified type: acute illness or injury  CHF (congestive heart failure) ([address]): acute illness or injury  Dyspnea: acute illness or injury  Thoracic outlet syndrome associated with cervical rib: chronic illness or injury     Details: Bilateral, recent left decompression of thoracic outlet syndrome and excision of left cervical rib  Amount and/or Complexity of Data Reviewed  Labs: ordered. Details: Laboratory tests were taken and were reviewed. Patient had 18,000 white count with no bandemia. Lactic was normal range. Troponin was normal.  Radiology: ordered and independent interpretation performed. Details: Chest x-ray demonstrates pulmonary edema with cardiomegaly and a left pleural effusion. This is new from comparison to a previous chest x-ray. CTA of the chest was interpreted by the radiologist.  Please see report. There was no pulmonary embolism identified.   Patient had bilateral atelectasis with left pleural effusion and groundglass abnormalities not consistent with COVID but concerning for atelectasis/early pneumonia  ECG/medicine tests: ordered and independent interpretation performed. Details: Inverted T waves in the inferior leads suggesting right heart strain and ischemia. There is no reciprocating changes. Risk  Prescription drug management. Decision regarding hospitalization. Disposition  Final diagnoses:   Dyspnea   CHF (congestive heart failure) (HCC) - left pleural effusion   Bilateral pneumonia   Thoracic outlet syndrome associated with cervical rib - SP left decompression   Chest pain, unspecified type     Time reflects when diagnosis was documented in both MDM as applicable and the Disposition within this note     Time User Action Codes Description Comment    9/25/2023  4:46 PM Kenhorst Shine Add [R06.00] Dyspnea     9/25/2023  4:46 PM Bill Shine Add [I50.9] CHF (congestive heart failure) ([address])     9/25/2023  4:46 PM Kenhorst Shine Modify [I50.9] CHF (congestive heart failure) ([address]) left pleural effusion    9/25/2023  4:47 PM Bill Shine Add [J18.9] Bilateral pneumonia     9/25/2023  4:47 PM Bill Shine Add [G54.0,  Q76.0] Thoracic outlet syndrome associated with cervical rib     9/25/2023  4:47 PM Kenhorst Shine Modify [G54.0,  Q76.0] Thoracic outlet syndrome associated with cervical rib SP left decompression    9/25/2023  5:25 PM Bill Shine Add [R07.2] Precordial pain     9/25/2023  5:25 PM Kenhorst Shine Remove [R07.2] Precordial pain     9/25/2023  5:25 PM Kenhorst Shine Add [R07.9] Chest pain, unspecified type       ED Disposition     ED Disposition   Admit    Condition   Stable    Date/Time   Mon Sep 25, 2023  5:23 PM    Comment   Case was discussed with Dr Pavan Minor and the patient's admission status was agreed to be Admission Status: inpatient status to the service of Dr. Pavan Minor .            Follow-up Information    None         Patient's Medications   Discharge Prescriptions    No medications on file       No discharge procedures on file.    PDMP Review       Value Time User    PDMP Reviewed  Yes 3/4/2022 10:00 PM Molina Steward MD          ED Provider  Electronically Signed by           Maria Teresa Jaime PA-C  09/25/23 15 Bruce Blanco PA-C  09/26/23 [id number]

## 2023-09-26 LAB
ALBUMIN SERPL BCP-MCNC: 3.9 G/DL (ref 3.5–5)
ALP SERPL-CCNC: 61 U/L (ref 34–104)
ALT SERPL W P-5'-P-CCNC: 41 U/L (ref 7–52)
ANION GAP SERPL CALCULATED.3IONS-SCNC: 9 MMOL/L
AST SERPL W P-5'-P-CCNC: 26 U/L (ref 13–39)
BASOPHILS # BLD AUTO: 0.08 THOUSANDS/ÂΜL (ref 0–0.1)
BASOPHILS NFR BLD AUTO: 1 % (ref 0–1)
BILIRUB SERPL-MCNC: 0.36 MG/DL (ref 0.2–1)
BUN SERPL-MCNC: 10 MG/DL (ref 5–25)
CALCIUM SERPL-MCNC: 9 MG/DL (ref 8.4–10.2)
CHLORIDE SERPL-SCNC: 101 MMOL/L (ref 96–108)
CO2 SERPL-SCNC: 26 MMOL/L (ref 21–32)
CREAT SERPL-MCNC: 0.75 MG/DL (ref 0.6–1.3)
EOSINOPHIL # BLD AUTO: 1.2 THOUSAND/ÂΜL (ref 0–0.61)
EOSINOPHIL NFR BLD AUTO: 7 % (ref 0–6)
ERYTHROCYTE [DISTWIDTH] IN BLOOD BY AUTOMATED COUNT: 14.1 % (ref 11.6–15.1)
GFR SERPL CREATININE-BSD FRML MDRD: 100 ML/MIN/1.73SQ M
GLUCOSE SERPL-MCNC: 89 MG/DL (ref 65–140)
HCT VFR BLD AUTO: 36.5 % (ref 34.8–46.1)
HGB BLD-MCNC: 11.5 G/DL (ref 11.5–15.4)
IMM GRANULOCYTES # BLD AUTO: 0.28 THOUSAND/UL (ref 0–0.2)
IMM GRANULOCYTES NFR BLD AUTO: 2 % (ref 0–2)
L PNEUMO1 AG UR QL IA.RAPID: NEGATIVE
LYMPHOCYTES # BLD AUTO: 3.36 THOUSANDS/ÂΜL (ref 0.6–4.47)
LYMPHOCYTES NFR BLD AUTO: 20 % (ref 14–44)
MCH RBC QN AUTO: 27.4 PG (ref 26.8–34.3)
MCHC RBC AUTO-ENTMCNC: 31.5 G/DL (ref 31.4–37.4)
MCV RBC AUTO: 87 FL (ref 82–98)
MONOCYTES # BLD AUTO: 2.02 THOUSAND/ÂΜL (ref 0.17–1.22)
MONOCYTES NFR BLD AUTO: 12 % (ref 4–12)
NEUTROPHILS # BLD AUTO: 9.69 THOUSANDS/ÂΜL (ref 1.85–7.62)
NEUTS SEG NFR BLD AUTO: 58 % (ref 43–75)
NRBC BLD AUTO-RTO: 0 /100 WBCS
PLATELET # BLD AUTO: 375 THOUSANDS/UL (ref 149–390)
PMV BLD AUTO: 8.9 FL (ref 8.9–12.7)
POTASSIUM SERPL-SCNC: 3.7 MMOL/L (ref 3.5–5.3)
PROCALCITONIN SERPL-MCNC: 0.06 NG/ML
PROCALCITONIN SERPL-MCNC: 0.07 NG/ML
PROT SERPL-MCNC: 6.6 G/DL (ref 6.4–8.4)
RBC # BLD AUTO: 4.19 MILLION/UL (ref 3.81–5.12)
S PNEUM AG UR QL: NEGATIVE
SODIUM SERPL-SCNC: 136 MMOL/L (ref 135–147)
WBC # BLD AUTO: 16.63 THOUSAND/UL (ref 4.31–10.16)

## 2023-09-26 PROCEDURE — 99232 SBSQ HOSP IP/OBS MODERATE 35: CPT | Performed by: INTERNAL MEDICINE

## 2023-09-26 PROCEDURE — 84145 PROCALCITONIN (PCT): CPT | Performed by: PHYSICIAN ASSISTANT

## 2023-09-26 PROCEDURE — 80053 COMPREHEN METABOLIC PANEL: CPT | Performed by: PHYSICIAN ASSISTANT

## 2023-09-26 PROCEDURE — 85025 COMPLETE CBC W/AUTO DIFF WBC: CPT | Performed by: PHYSICIAN ASSISTANT

## 2023-09-26 RX ORDER — METHYLPREDNISOLONE SODIUM SUCCINATE 40 MG/ML
40 INJECTION, POWDER, LYOPHILIZED, FOR SOLUTION INTRAMUSCULAR; INTRAVENOUS DAILY
Status: DISCONTINUED | OUTPATIENT
Start: 2023-09-26 | End: 2023-09-28

## 2023-09-26 RX ORDER — FAMOTIDINE 10 MG/ML
20 INJECTION, SOLUTION INTRAVENOUS ONCE
Status: COMPLETED | OUTPATIENT
Start: 2023-09-26 | End: 2023-09-26

## 2023-09-26 RX ORDER — FUROSEMIDE 10 MG/ML
20 INJECTION INTRAMUSCULAR; INTRAVENOUS ONCE
Status: COMPLETED | OUTPATIENT
Start: 2023-09-26 | End: 2023-09-26

## 2023-09-26 RX ORDER — DIPHENHYDRAMINE HYDROCHLORIDE 50 MG/ML
25 INJECTION INTRAMUSCULAR; INTRAVENOUS ONCE
Status: COMPLETED | OUTPATIENT
Start: 2023-09-26 | End: 2023-09-26

## 2023-09-26 RX ORDER — DIPHENHYDRAMINE HYDROCHLORIDE 50 MG/ML
25 INJECTION INTRAMUSCULAR; INTRAVENOUS EVERY 6 HOURS PRN
Status: DISCONTINUED | OUTPATIENT
Start: 2023-09-26 | End: 2023-09-27

## 2023-09-26 RX ADMIN — DIPHENHYDRAMINE HYDROCHLORIDE 25 MG: 50 INJECTION, SOLUTION INTRAMUSCULAR; INTRAVENOUS at 10:20

## 2023-09-26 RX ADMIN — BUDESONIDE AND FORMOTEROL FUMARATE DIHYDRATE 2 PUFF: 160; 4.5 AEROSOL RESPIRATORY (INHALATION) at 09:35

## 2023-09-26 RX ADMIN — ENOXAPARIN SODIUM 40 MG: 40 INJECTION SUBCUTANEOUS at 09:36

## 2023-09-26 RX ADMIN — ACETAMINOPHEN 650 MG: 325 TABLET, FILM COATED ORAL at 01:28

## 2023-09-26 RX ADMIN — FUROSEMIDE 20 MG: 10 INJECTION, SOLUTION INTRAMUSCULAR; INTRAVENOUS at 09:35

## 2023-09-26 RX ADMIN — OXYCODONE HYDROCHLORIDE 10 MG: 10 TABLET ORAL at 20:09

## 2023-09-26 RX ADMIN — GABAPENTIN 300 MG: 300 CAPSULE ORAL at 17:18

## 2023-09-26 RX ADMIN — DIPHENHYDRAMINE HYDROCHLORIDE 25 MG: 25 TABLET ORAL at 01:25

## 2023-09-26 RX ADMIN — GABAPENTIN 300 MG: 300 CAPSULE ORAL at 21:40

## 2023-09-26 RX ADMIN — DIPHENHYDRAMINE HYDROCHLORIDE 25 MG: 50 INJECTION, SOLUTION INTRAMUSCULAR; INTRAVENOUS at 17:17

## 2023-09-26 RX ADMIN — OXYCODONE HYDROCHLORIDE 10 MG: 10 TABLET ORAL at 14:12

## 2023-09-26 RX ADMIN — GABAPENTIN 300 MG: 300 CAPSULE ORAL at 09:35

## 2023-09-26 RX ADMIN — OXYCODONE HYDROCHLORIDE 5 MG: 5 TABLET ORAL at 01:27

## 2023-09-26 RX ADMIN — ALBUTEROL SULFATE 2 PUFF: 90 AEROSOL, METERED RESPIRATORY (INHALATION) at 13:08

## 2023-09-26 RX ADMIN — FAMOTIDINE 20 MG: 10 INJECTION, SOLUTION INTRAVENOUS at 02:51

## 2023-09-26 RX ADMIN — CYCLOBENZAPRINE 5 MG: 10 TABLET, FILM COATED ORAL at 09:34

## 2023-09-26 RX ADMIN — ALBUTEROL SULFATE 2 PUFF: 90 AEROSOL, METERED RESPIRATORY (INHALATION) at 20:10

## 2023-09-26 RX ADMIN — DIPHENHYDRAMINE HYDROCHLORIDE 25 MG: 50 INJECTION INTRAMUSCULAR; INTRAVENOUS at 02:51

## 2023-09-26 RX ADMIN — DIPHENHYDRAMINE HYDROCHLORIDE 25 MG: 25 TABLET ORAL at 07:46

## 2023-09-26 RX ADMIN — CEFTRIAXONE SODIUM 2000 MG: 10 INJECTION, POWDER, FOR SOLUTION INTRAVENOUS at 17:19

## 2023-09-26 RX ADMIN — CYCLOBENZAPRINE 5 MG: 10 TABLET, FILM COATED ORAL at 17:17

## 2023-09-26 RX ADMIN — BUDESONIDE AND FORMOTEROL FUMARATE DIHYDRATE 2 PUFF: 160; 4.5 AEROSOL RESPIRATORY (INHALATION) at 17:19

## 2023-09-26 RX ADMIN — OXYCODONE HYDROCHLORIDE 10 MG: 10 TABLET ORAL at 05:55

## 2023-09-26 RX ADMIN — ENOXAPARIN SODIUM 40 MG: 40 INJECTION SUBCUTANEOUS at 21:40

## 2023-09-26 RX ADMIN — HYDROXYCHLOROQUINE SULFATE 200 MG: 200 TABLET, FILM COATED ORAL at 17:17

## 2023-09-26 RX ADMIN — HYDROXYCHLOROQUINE SULFATE 200 MG: 200 TABLET, FILM COATED ORAL at 10:19

## 2023-09-26 RX ADMIN — GUAIFENESIN AND DEXTROMETHORPHAN 10 ML: 100; 10 SYRUP ORAL at 06:59

## 2023-09-26 RX ADMIN — METHYLPREDNISOLONE SODIUM SUCCINATE 40 MG: 40 INJECTION, POWDER, FOR SOLUTION INTRAMUSCULAR; INTRAVENOUS at 21:40

## 2023-09-26 NOTE — ASSESSMENT & PLAN NOTE
· Hospitalized at [facility] for scheduled thoracic outlet surgery 9/19/2023 - 9/22/2023  · Cont home pain regimen: oxy, gabapentin, flexeril

## 2023-09-26 NOTE — PLAN OF CARE
Problem: PAIN - ADULT  Goal: Verbalizes/displays adequate comfort level or baseline comfort level  Description: Interventions:  - Encourage patient to monitor pain and request assistance  - Assess pain using appropriate pain scale  - Administer analgesics based on type and severity of pain and evaluate response  - Implement non-pharmacological measures as appropriate and evaluate response  - Consider cultural and social influences on pain and pain management  - Notify physician/advanced practitioner if interventions unsuccessful or patient reports new pain  Outcome: Progressing     Problem: INFECTION - ADULT  Goal: Absence or prevention of progression during hospitalization  Description: INTERVENTIONS:  - Assess and monitor for signs and symptoms of infection  - Monitor lab/diagnostic results  - Monitor all insertion sites, i.e. indwelling lines, tubes, and drains  - Monitor endotracheal if appropriate and nasal secretions for changes in amount and color  - [place] appropriate cooling/warming therapies per order  - Administer medications as ordered  - Instruct and encourage patient and family to use good hand hygiene technique  - Identify and instruct in appropriate isolation precautions for identified infection/condition  Outcome: Progressing  Goal: Absence of fever/infection during neutropenic period  Description: INTERVENTIONS:  - Monitor WBC    Outcome: Progressing     Problem: SAFETY ADULT  Goal: Patient will remain free of falls  Description: INTERVENTIONS:  - Educate patient/family on patient safety including physical limitations  - Instruct patient to call for assistance with activity   - Consult OT/PT to assist with strengthening/mobility   - Keep Call bell within reach  - Keep bed low and locked with side rails adjusted as appropriate  - Keep care items and personal belongings within reach  - Initiate and maintain comfort rounds  - Make Fall Risk Sign visible to staff  - Offer Toileting every  Hours, in advance of need  - Initiate/Maintain alarm  - Obtain necessary fall risk management equipment:   - Apply yellow socks and bracelet for high fall risk patients  - Consider moving patient to room near nurses station  Outcome: Progressing  Goal: Maintain or return to baseline ADL function  Description: INTERVENTIONS:  -  Assess patient's ability to carry out ADLs; assess patient's baseline for ADL function and identify physical deficits which impact ability to perform ADLs (bathing, care of mouth/teeth, toileting, grooming, dressing, etc.)  - Assess/evaluate cause of self-care deficits   - Assess range of motion  - Assess patient's mobility; develop plan if impaired  - Assess patient's need for assistive devices and provide as appropriate  - Encourage maximum independence but intervene and supervise when necessary  - Involve family in performance of ADLs  - Assess for home care needs following discharge   - Consider OT consult to assist with ADL evaluation and planning for discharge  - Provide patient education as appropriate  Outcome: Progressing  Goal: Maintains/Returns to pre admission functional level  Description: INTERVENTIONS:  - Perform BMAT or MOVE assessment daily.   - Set and communicate daily mobility goal to care team and patient/family/caregiver. - Collaborate with rehabilitation services on mobility goals if consulted  - Perform Range of Motion  times a day. - Reposition patient every  hours.   - Dangle patient  times a day  - Stand patient  times a day  - Ambulate patient  times a day  - Out of bed to chair  times a day   - Out of bed for meals imes a day  - Out of bed for toileting  - Record patient progress and toleration of activity level   Outcome: Progressing     Problem: DISCHARGE PLANNING  Goal: Discharge to home or other facility with appropriate resources  Description: INTERVENTIONS:  - Identify barriers to discharge w/patient and caregiver  - Arrange for needed discharge resources and transportation as appropriate  - Identify discharge learning needs (meds, wound care, etc.)  - Arrange for interpretive services to assist at discharge as needed  - Refer to Case Management Department for coordinating discharge planning if the patient needs post-hospital services based on physician/advanced practitioner order or complex needs related to functional status, cognitive ability, or social support system  Outcome: Progressing     Problem: Knowledge Deficit  Goal: Patient/family/caregiver demonstrates understanding of disease process, treatment plan, medications, and discharge instructions  Description: Complete learning assessment and assess knowledge base.   Interventions:  - Provide teaching at level of understanding  - Provide teaching via preferred learning methods  Outcome: Progressing

## 2023-09-26 NOTE — ASSESSMENT & PLAN NOTE
· Hospitalized at [facility] for scheduled thoracic outlet surgery 9/19/2023 - 9/22/2023  · Cont home pain regimen: oxy, gabapentin, flexeril

## 2023-09-26 NOTE — ASSESSMENT & PLAN NOTE
· H/o anaphylaxis to Toradol in the PACU after her thoracic outlet surgery  · Cont home antihistamine formulary substitute  · Benadryl prn

## 2023-09-26 NOTE — ASSESSMENT & PLAN NOTE
· GGO mostly the right lung on CTA PE, could represent atypical PNA vs pulm edema from fluid overload  · Given recent hospitalization, Plaqunil use, and intubation for surgery on 9/19 would treat for PNA now and monitor respiratory status  · Rocephin 2 G q24h   · Pulm toilet  · Symptomatic treatment  · Covid/flu RSV negative  · Fu on CT chest as OP eventually to monitor for resolution

## 2023-09-26 NOTE — ASSESSMENT & PLAN NOTE
· Fluid overload likely from iatrogenic IVFs during recent hospitalization/ surgery  · BNP wnl doubt CHF given lack of risk factors  · Weight was 231 on dc from [facility]- now 244  · Daily weights  · Good urine output with 1 dose IV lasix in the ED  · Give 1 more dose tomorrow morning

## 2023-09-26 NOTE — PROGRESS NOTES
[id number]  Progress Note  Name: Venkatesh Dale  MRN: [id number]  Unit/Bed#: S -01 I Date of Admission: 9/25/2023   Date of Service: 9/26/2023 I Hospital Day: 1    Assessment/Plan   Allergy  Assessment & Plan  · H/o anaphylaxis to Toradol in the PACU after her thoracic outlet surgery  · Cont home antihistamine formulary substitute  · Benadryl prn    GABBY (obstructive sleep apnea)  Assessment & Plan  · CPAP qhs    Fluid overload  Assessment & Plan  · Fluid overload likely from iatrogenic IVFs during recent hospitalization/ surgery  · BNP wnl doubt CHF given lack of risk factors  · Weight was 231 on dc from [facility]- now 244  · Daily weights  · Good urine output with 1 dose IV lasix in the ED  · I/O  · Daily weights    Asthma  Assessment & Plan  · Not acutely exacerbated  · Albuterol inhaler prn  · Cont home Singulair and Symbicort    Atelectasis  Assessment & Plan  · OOB as tolerated  · Incentive spirometer    Sepsis (HCC)  Assessment & Plan  · Tachycardia, leukocytosis- improving  · Procal pending  · Await urine antigens, blood cultures and cont treatment for PNA with Rocephin  · No IVFs given concomitant fluid overload      History of recent hospitalization  Assessment & Plan  · Hospitalized at [facility] for scheduled thoracic outlet surgery 9/19/2023 - 9/22/2023  · Cont home pain regimen: oxy, gabapentin, flexeril    Leukocytosis  Assessment & Plan  · Patient reports she always has leukocytosis, appears her baseline is around 12?   · Could be from smoking  · Monitor CBC tomorrow with current treatment for PNA    Tobacco abuse  Assessment & Plan  · NRT    ADHD (attention deficit hyperactivity disorder)  Assessment & Plan  · Cont formulary sub for Home adderrall    * Pneumonia  Assessment & Plan  · GGO mostly the right lung on CTA PE, could represent atypical PNA vs pulm edema from fluid overload  · Given recent hospitalization, Plaqunil use, and intubation for surgery on 9/19 would treat for PNA now and monitor respiratory status  · Rocephin 2 G q24h   · Pulm toilet  · Symptomatic treatment  · Covid/flu RSV negative  · Fu on CT chest as OP eventually to monitor for resolution               VTE Pharmacologic Prophylaxis: VTE Score: 4 Moderate Risk (Score 3-4) - Pharmacological DVT Prophylaxis Ordered: heparin. Patient Centered Rounds: I performed bedside rounds with nursing staff today. Discussions with Specialists or Other Care Team Provider: discussed with nursing. Education and Discussions with Family / Patient: will udpated family. .     Total Time Spent on Date of Encounter in care of patient: 30  mins. This time was spent on one or more of the following: performing physical exam; counseling and coordination of care; obtaining or reviewing history; documenting in the medical record; reviewing/ordering tests, medications or procedures; communicating with other healthcare professionals and discussing with patient's family/caregivers. Current Length of Stay: 1 day(s)  Current Patient Status: Inpatient   Certification Statement: The patient will continue to require additional inpatient hospital stay due to IV lasix and abx   Discharge Plan: Anticipate discharge in 48 hrs to home. Code Status: Level 1 - Full Code    Subjective:   Patient seen and examined   Feeling "better" now that she got benadryl and pain meds. Objective:     Vitals:   Temp (24hrs), Av.5 °F (36.9 °C), Min:98.2 °F (36.8 °C), Max:99.2 °F (37.3 °C)    Temp:  [98.2 °F (36.8 °C)-99.2 °F (37.3 °C)] 98.4 °F (36.9 °C)  HR:  [100-119] 100  Resp:  [18-22] 18  BP: (126-164)/(70-88) 151/84  SpO2:  [93 %-99 %] 94 %  Body mass index is 41.79 kg/m². Input and Output Summary (last 24 hours):      Intake/Output Summary (Last 24 hours) at 2023 1104  Last data filed at 2023 2302  Gross per 24 hour   Intake 50 ml   Output 100 ml   Net -50 ml       Physical Exam:   Physical Exam  Constitutional:       General: She is not in acute distress. Appearance: She is not ill-appearing or toxic-appearing. Eyes:      General:         Right eye: No discharge. Left eye: No discharge. Pupils: Pupils are equal, round, and reactive to light. Cardiovascular:      Rate and Rhythm: Tachycardia present. Heart sounds: No murmur heard. Pulmonary:      Effort: No respiratory distress. Breath sounds: No wheezing or rales. Abdominal:      General: There is no distension. Palpations: There is no mass. Tenderness: There is no abdominal tenderness. There is no right CVA tenderness, left CVA tenderness or rebound. Hernia: No hernia is present. Musculoskeletal:      Right lower leg: No edema. Left lower leg: No edema. Skin:     General: Skin is warm. Capillary Refill: Capillary refill takes less than 2 seconds. Coloration: Skin is not jaundiced. Findings: Erythema and rash present. No bruising or lesion. Neurological:      General: No focal deficit present. Mental Status: She is alert. Cranial Nerves: No cranial nerve deficit. Sensory: No sensory deficit. Motor: No weakness.       Coordination: Coordination normal.      Gait: Gait normal.      Deep Tendon Reflexes: Reflexes normal.   Psychiatric:         Mood and Affect: Mood normal.          Additional Data:     Labs:  Results from last 7 days   Lab Units 09/26/23  0556   WBC Thousand/uL 16.63*   HEMOGLOBIN g/dL 11.5   HEMATOCRIT % 36.5   PLATELETS Thousands/uL 375   NEUTROS PCT % 58   LYMPHS PCT % 20   MONOS PCT % 12   EOS PCT % 7*     Results from last 7 days   Lab Units 09/26/23  0556   SODIUM mmol/L 136   POTASSIUM mmol/L 3.7   CHLORIDE mmol/L 101   CO2 mmol/L 26   BUN mg/dL 10   CREATININE mg/dL 0.75   ANION GAP mmol/L 9   CALCIUM mg/dL 9.0   ALBUMIN g/dL 3.9   TOTAL BILIRUBIN mg/dL 0.36   ALK PHOS U/L 61   ALT U/L 41   AST U/L 26   GLUCOSE RANDOM mg/dL 89     Results from last 7 days   Lab Units 09/25/23  1251   INR  0.90             Results from last 7 days   Lab Units 09/26/23  0556 09/25/23  1447 09/25/23  1251   LACTIC ACID mmol/L  --  1.2  --    PROCALCITONIN ng/ml 0.06  --  0.07       Lines/Drains:  Invasive Devices     Peripheral Intravenous Line  Duration           Peripheral IV 09/25/23 Left Antecubital <1 day    Peripheral IV 09/25/23 Right Antecubital <1 day                      Imaging: No pertinent imaging reviewed. Recent Cultures (last 7 days):   Results from last 7 days   Lab Units 09/25/23  2301 09/25/23  1447 09/25/23  1359   BLOOD CULTURE   --  Received in Microbiology Lab. Culture in Progress. Received in Microbiology Lab. Culture in Progress.    LEGIONELLA URINARY ANTIGEN  Negative  --   --        Last 24 Hours Medication List:   Current Facility-Administered Medications   Medication Dose Route Frequency Provider Last Rate   • acetaminophen  650 mg Oral Q6H PRN Deb Dejesus PA-C     • albuterol  2 puff Inhalation Q6H PRN Deb Dejesus PA-C     • aluminum-magnesium hydroxide-simethicone  30 mL Oral Q6H PRN Deb Dejesus PA-C     • amphetamine-dextroamphetamine  10 mg Oral BID before breakfast/lunch Deb Dejesus PA-C     • budesonide-formoterol  2 puff Inhalation BID Deb Dejesus PA-C     • cefTRIAXone  2,000 mg Intravenous Q24H Deb Dejesus PA-C     • cyclobenzaprine  5 mg Oral TID PRN Deb Dejesus PA-C     • dextromethorphan-guaiFENesin  10 mL Oral Q4H PRN Deb Dejesus PA-C     • Diclofenac Sodium  2 g Topical 4x Daily PRN Deb Dejesus PA-C     • diphenhydrAMINE  25 mg Intravenous Q6H PRN Kris Bentley MD     • diphenhydrAMINE  25 mg Oral Q6H PRN Deb Dejesus PA-C     • enoxaparin  40 mg Subcutaneous Q12H [address] Deb Dejesus PA-C     • gabapentin  300 mg Oral TID Deb Dejesus PA-C     • hydroxychloroquine  200 mg Oral BID Deb Dejesus PA-C     • lidocaine   Topical 4x Daily PRN Deb Dejesus PA-C     • loratadine  10 mg Oral HS Deb Dejesus PA-C     • melatonin  6 mg Oral HS PRN Deb Dejesus PA-C     • montelukast  10 mg Oral HS Deb Dejesus PA-C     • nicotine  1 patch Transdermal Daily Deb Dejesus PA-C     • ondansetron  4 mg Intravenous Q6H PRN Deb Dejesus PA-C     • oxyCODONE  10 mg Oral Q6H PRN Deb Dejesus PA-C     • oxyCODONE  5 mg Oral Q6H PRN Deb Dejesus PA-C     • senna-docusate sodium  1 tablet Oral HS Yarelis Jimenes PA-C          Today, Patient Was Seen By: Leelee Cabrera MD    **Please Note: This note may have been constructed using a voice recognition system. **

## 2023-09-26 NOTE — ASSESSMENT & PLAN NOTE
Central Prior Authorization Team   Phone: [phone]      PA Initiation    Medication: diclofenac (VOLTAREN) 1 % topical gel  Insurance Company: [employer] - Phone [phone] Fax [phone]  Pharmacy Filling the Rx: [id number] [address]  Filling Pharmacy Phone: [phone]  Filling Pharmacy Fax:    Start Date: 2/26/2021         · H/o anaphylaxis to Toradol in the PACU after her thoracic outlet surgery  · Cont home antihistamine formulary substitute  · Benadryl prn

## 2023-09-26 NOTE — UTILIZATION REVIEW
Initial Clinical Review    Admission: Date/Time/Statement:   Admission Orders (From admission, onward)     Ordered        09/25/23 1724  INPATIENT ADMISSION  Once                      Orders Placed This Encounter   Procedures   • INPATIENT ADMISSION     Standing Status:   Standing     Number of Occurrences:   1     Order Specific Question:   Level of Care     Answer:   Med Surg [16]     Order Specific Question:   Estimated length of stay     Answer:   More than 2 Midnights     Order Specific Question:   Certification     Answer:   I certify that inpatient services are medically necessary for this patient for a duration of greater than two midnights. See H&P and MD Progress Notes for additional information about the patient's course of treatment. ED Arrival Information     Expected   -    Arrival   9/25/2023 11:56    Acuity   Urgent            Means of arrival   Walk-In    Escorted by   Self    Service   Hospitalist    Admission type   Emergency            Arrival complaint   -           Chief Complaint   Patient presents with   • Shortness of Breath     Pt arrives via ems from home, SOB, possible allergic reaction, hx asthma, given duo neb and 50 of benadryl via ems, dry mouth, recent surgery in left collar bone       Initial Presentation: 43 y.o. female with a PMH of asthma, thoracic outlet sdr s/p REMOVAL 1ST/CERVICAL FSF 5/56/5577 complicated by anaphylaxis to Toradol, ADHD, obesity, GABBY who presents with SOB x 2 days. Patient reports on dc from [address] [place] [address] on 9/22/2023 she had non-productive coughing fits. Benadryl helped. Thought she was having an asthma attack but tried home nebulizer and even some steroid that her daughter had left over but her coughing and PRATHER worsened. Had malaise, too, anorexia x 2 days. Notes swelling in her arms and pitting edema in bl le which is not normal for her. Had PRATHER walking up her steps, was trying to use incentive spirometer at home but only able to get to 500.  Last night she had orthopnea while trying to use her CPAP and it felt like someone was sitting on her chest. + chest tightness but she "doesn't hear wheezing." Today she had visiting RN to the home and he thought her tongue was swollen so she decided to come to the ED. Plan: Inpatient admission for evaluation and treatment of pneumonia, fluid overload, sleep apnea, asthma, sepsis, leukocytosis, tobacco abuse, ADHD: IV Rocephin, IV lasix given in ED with plans to give 1 more dose in am, CPAP at HS, continue home Singulair and Symbicort, procal pending, await urine antigens and blood cultures, IV Rocephin, continue pain management after thoracic outlet surgery with oxy, gabapentin, flexeril, monitor CBC, nicotine replacement therapy, continue formulary substitute for adderall. Date: 9/26   Day 2:     Internal medicine: continue IV Rocephin, pulmonary toilet, continue home Singulair and Symbicort, incentive spirometer, follow urine antigens and blood cultures, continue oxy, gabapentin, flexeril.      ED Triage Vitals   Temperature Pulse Respirations Blood Pressure SpO2   09/25/23 1213 09/25/23 1158 09/25/23 1158 09/25/23 1158 09/25/23 1158   98.2 °F (36.8 °C) (!) 119 22 144/76 99 %      Temp Source Heart Rate Source Patient Position - Orthostatic VS BP Location FiO2 (%)   09/25/23 1213 09/25/23 1158 09/25/23 1158 09/25/23 1158 --   Oral Monitor Sitting Right arm       Pain Score       09/25/23 1158       8          Wt Readings from Last 1 Encounters:   09/26/23 107 kg (235 lb 14.3 oz)     Additional Vital Signs:     Date/Time Temp Pulse Resp BP MAP (mmHg) SpO2 O2 Device   09/26/23 0832 -- 100 18 -- -- 94 % --   09/26/23 0700 98.4 °F (36.9 °C) 103 18 151/84 -- 94 % None (Room air)   09/26/23 02:41:19 -- -- -- 126/74 91 -- --   09/25/23 2220 99.2 °F (37.3 °C) 106 Abnormal  18 158/88 -- 94 % --   09/25/23 1844 -- 116 Abnormal  20 149/87 -- 93 % --   09/25/23 1625 -- 117 Abnormal  20 152/80 105 94 % None (Room air)   09/25/23 1551 98.2 °F (36.8 °C) 107 Abnormal  20 138/74 99 95 % None (Room air)   09/25/23 1517 -- 116 Abnormal  20 164/70 101 97 % None (Room air)   09/25/23 1213 98.2 °F (36.8 °C) -- -- -- -- -- --     Pertinent Labs/Diagnostic Test Results:   CTA ED chest PE Study   ED Interpretation by Garcia Dent PA-C (09/25 7311)   CTA ED chest PE Study  Status: Final result    PACS Images     Show images for CTA ED chest PE Study  Study Result    Narrative & Impression  CTA - CHEST WITH IV CONTRAST - PULMONARY ANGIOGRAM     INDICATION:   dyspnea. Recent surgery left clavicle.     COMPARISON: December 20, 2016     TECHNIQUE: CTA examination of the chest was performed using angiographic technique according to a protocol specifically tailored to evaluate for pulmonary embolism. Multiplanar 2D reformatted images were created from the source data. In addition,   coronal 3D MIP postprocessing was performed on the acquisition scanner.     Radiation dose length product (DLP) for this visit:  693 mGy-cm . This examination, like all CT scans performed in the [facility], was performed utilizing techniques to minimize radiation dose exposure, including the use of iterative   reconstruction and automated exposure control.     IV Contrast:  100 mL of iohexol (OMNIPAQUE)     FINDINGS:     PULMONARY ARTERIAL    TREE:  No pulmonary embolus is seen.     LUNGS:   There is no tracheal or endobronchial lesion.     Ill-defined groundglass opacity in the right lung apex. Additional ill-defined groundglass opacities in the periphery of the right upper lobe and right lower lobe. No consolidation.     PLEURA: Small left pleural effusion. Left apical and basilar atelectasis. Scattered scarring     HEART/GREAT VESSELS:  Unremarkable for patient's age.  No thoracic aortic aneurysm.     MEDIASTINUM AND CORNELL:  Unremarkable.     CHEST WALL AND LOWER NECK:   Postop changes in the region of the left clavicle compatible with known recent surgery.     VISUALIZED STRUCTURES IN THE UPPER ABDOMEN: Fatty infiltration of liver. Contracted gallbladder. Unremarkable spleen with accessory splenule. Low-lying right kidney not visualized. Left kidney unremarkable. Adrenals unremarkable. Pancreas unremarkable. Atypical spigelian hernia extending between transversalis muscles and lower ribs only partially imaged.     OSS   EOUS STRUCTURES:  No acute fracture or destructive osseous lesion.        IMPRESSION:  No central pulmonary embolus. No right heart strain. Groundglass opacities in the periphery of the right lung most pronounced at the right lung apex (no COVID detected). Atelectatic changes both lungs most pronounced at the left lung base.                 Workstation performed: [id number]       Imaging    CTA ED chest PE Study (Order: [id number]) - 9/25/2023    Result History    CTA ED chest PE Study (Order #[id number]) on 9/25/2023 - Order Result History Report    Order Report    Order Details  Order Questions    Question Answer Comment  Does the patient have renal dysfunction? (Based on GFR Value) > 60 or young healthy patient w/ no clinical reason to suspect renal dysfunction   Proceed to CT PE Evaluation. Order can be completed and signed   Pregnant? No   What is the patient's sedation requirement? If Medication for Claustrophobia is selected, order medication at this point. No Sedation   Contras   t Information: IV ONLY   Note: If this is a PO only order, please change to a CT ABDOMEN PELVIS WO  Did the patient ever have a reaction to x-ray dye? If yes, please verify the type of allergy and order the contrast allergy prep. No   Note: If yes, please verify the type of allergy and order the contrast allergy prep. Did you complete and document the Wells' Score?  If "No", Please use the link below to complete the score Yes 4.5  Exam reason dyspnea   Note: Enter reason for exam  Decision Support Exception Emergency Medical Condition (MA)          Result Information    Status Priority Source  Final result (9/25/2023 1632) STAT     Other Results from 9/25/2023     HS Troponin I 2hr  Final result 9/25/2023   Blood culture  In process 9/25/2023   Lactic acid, plasma (w/reflex if result > 2.0)  Final result 9/25/2023   Blood culture  In process 9/25/2023   UA w Reflex to Microscopic w Reflex to Culture  Final result 9/25/2023   CBC and differential  Final result 9/25/2023   Protime-INR     Final result 9/25/2023   APTT  Final result 7/38/5137   Basic metabolic panel  Final result 9/25/2023   HS Troponin 0hr (reflex protocol)  Final result 9/25/2023   B-Type Natriuretic Peptide(BNP)  Final result 9/25/2023   important suggestion  Warning: Additional results from 9/25/2023 are available but are not displayed in this report. Reason for Exam    dyspnea        CTA ED chest PE Study: Patient Communication    Add Comments  Not seen        Signed by    Signed Date/Time  Phone Pager  Norma Markham 9/25/2023 16:32 [phone]       Exam Information    Status Exam Begun  Exam Ended  Performing Tech  Final [99] 9/25/2023 15:01 9/25/2023 15:09 Aurea Cola      Screening Form Questions    No questions have been answered for this form. External Results Report      Open External Results Report     Encounter      View Encounter                 Patient Care Timeline    No data selected in time range    Pre-op Summary      Pre-op            Recovery Summary      Recovery                Routing History    None            Final Result by Norma Markham DO (09/25 1632)   No central pulmonary embolus. No right heart strain. Groundglass opacities in the periphery of the right lung most pronounced at the right lung apex (no COVID detected). Atelectatic changes both lungs most pronounced at the left lung base.                   Workstation performed: [id number]         VAS lower limb venous duplex study, complete bilateral   Final Result by Dionna Hodge MD (09/25 8833)      XR chest 1 view portable   ED Interpretation by Yumikoor NATALIA Castillo (09/25 1304)   For inspiration, mild CHF, cardiomegaly, questionable left pleural effusion      Final Result by Aleks Waters MD (09/25 9627)      Bilateral  opacities could represent pulmonary edema or atypical pneumonia in the appropriate clinical setting. Left-sided pleural effusion                  Resident: Emerson Crouch I, the attending radiologist, have reviewed the images and agree with the final report above.       Workstation performed: [id number]           9/25 EKG:  Sinus tachycardia  ST & T wave abnormality, consider inferior ischemia  Abnormal ECG  When compared with ECG of 15-SEP-2018 14:28,  Non-specific change in ST segment in Anterior leads  T wave inversion more evident in Inferior leads  T wave inversion now evident in Lateral leads      Results from last 7 days   Lab Units 09/26/23  0556 09/25/23  1251   WBC Thousand/uL 16.63* 18.66*   HEMOGLOBIN g/dL 11.5 11.5   HEMATOCRIT % 36.5 36.1   PLATELETS Thousands/uL 375 311   NEUTROS ABS Thousands/µL 9.69* 12.26*         Results from last 7 days   Lab Units 09/26/23  0556 09/25/23  1251   SODIUM mmol/L 136 138   POTASSIUM mmol/L 3.7 3.7   CHLORIDE mmol/L 101 104   CO2 mmol/L 26 25   ANION GAP mmol/L 9 9   BUN mg/dL 10 8   CREATININE mg/dL 0.75 0.68   EGFR ml/min/1.73sq m 100 110   CALCIUM mg/dL 9.0 9.1     Results from last 7 days   Lab Units 09/26/23  0556   AST U/L 26   ALT U/L 41   ALK PHOS U/L 61   TOTAL PROTEIN g/dL 6.6   ALBUMIN g/dL 3.9   TOTAL BILIRUBIN mg/dL 0.36         Results from last 7 days   Lab Units 09/26/23  0556 09/25/23  1251   GLUCOSE RANDOM mg/dL 89 91         Results from last 7 days   Lab Units 09/25/23  1700 09/25/23  1522 09/25/23  1251   HS TNI 0HR ng/L  --   --  4   HS TNI 2HR ng/L  --  4  --    HSTNI D2 ng/L  --  0  --    HS TNI 4HR ng/L 4  --   --    HSTNI D4 ng/L 0  --   --          Results from last 7 days   Lab Units 09/25/23  1251   PROTIME seconds 12.7   INR  0.90   PTT seconds 23         Results from last 7 days   Lab Units 09/26/23  0556 09/25/23  1251   PROCALCITONIN ng/ml 0.06 0.07     Results from last 7 days   Lab Units 09/25/23  1447   LACTIC ACID mmol/L 1.2             Results from last 7 days   Lab Units 09/25/23  1251   BNP pg/mL 26           Results from last 7 days   Lab Units 09/25/23  1337   CLARITY UA  Clear   COLOR UA  Light Yellow   SPEC GRAV UA  1.011   PH UA  7.0   GLUCOSE UA mg/dl Negative   KETONES UA mg/dl Negative   BLOOD UA  Negative   PROTEIN UA mg/dl Negative   NITRITE UA  Negative   BILIRUBIN UA  Negative   UROBILINOGEN UA (BE) mg/dl <2.0   LEUKOCYTES UA  Negative     Results from last 7 days   Lab Units 09/25/23  2301   STREP PNEUMONIAE ANTIGEN, URINE  Negative   LEGIONELLA URINARY ANTIGEN  Negative           Results from last 7 days   Lab Units 09/25/23  1447 09/25/23  1359   BLOOD CULTURE  Received in Microbiology Lab. Culture in Progress. Received in Microbiology Lab. Culture in Progress.          ED Treatment:   Medication Administration from 09/25/2023 1155 to 09/25/2023 1829       Date/Time Order Dose Route Action     09/25/2023 1555 EDT ceftriaxone (ROCEPHIN) 2 g/50 mL in dextrose IVPB 2,000 mg Intravenous New Bag     09/25/2023 1405 EDT morphine injection 6 mg 6 mg Intravenous Given     09/25/2023 1400 EDT ondansetron (ZOFRAN) injection 4 mg 4 mg Intravenous Given     09/25/2023 1522 EDT furosemide (LASIX) injection 40 mg 40 mg Intravenous Given     09/25/2023 1456 EDT diphenhydrAMINE (BENADRYL) injection 25 mg 25 mg Intravenous Given     09/25/2023 1507 EDT iohexol (OMNIPAQUE) 350 MG/ML injection (MULTI-DOSE) 100 mL 100 mL Intravenous Given     09/25/2023 1553 EDT morphine injection 4 mg 4 mg Intravenous Given     09/25/2023 1709 EDT azithromycin (ZITHROMAX) 500 mg in sodium chloride 0.9% 250mL IVPB 500 mg 500 mg Intravenous New Bag        Past Medical History:   Diagnosis Date   • Allergic rhinitis    • Asthma    • Psychiatric disorder      Present on Admission:  • ADHD (attention deficit hyperactivity disorder)  • Leukocytosis  • Tobacco abuse      Admitting Diagnosis: CHF (congestive heart failure) (HCC) [I50.9]  Dyspnea [R06.00]  SOB (shortness of breath) [R06.02]  Bilateral pneumonia [J18.9]  Thoracic outlet syndrome associated with cervical rib [G54.0, Q76.0]  Chest pain, unspecified type [R07.9]  Age/Sex: 43 y.o. female  Admission Orders:  Scheduled Medications:  amphetamine-dextroamphetamine, 10 mg, Oral, BID before breakfast/lunch  budesonide-formoterol, 2 puff, Inhalation, BID  cefTRIAXone, 2,000 mg, Intravenous, Q24H  enoxaparin, 40 mg, Subcutaneous, Q12H JEEVAN  gabapentin, 300 mg, Oral, TID  hydroxychloroquine, 200 mg, Oral, BID  loratadine, 10 mg, Oral, HS  montelukast, 10 mg, Oral, HS  nicotine, 1 patch, Transdermal, Daily  senna-docusate sodium, 1 tablet, Oral, HS      Continuous IV Infusions:     PRN Meds:  acetaminophen, 650 mg, Oral, Q6H PRN  albuterol, 2 puff, Inhalation, Q6H PRN  aluminum-magnesium hydroxide-simethicone, 30 mL, Oral, Q6H PRN  cyclobenzaprine, 5 mg, Oral, TID PRN  dextromethorphan-guaiFENesin, 10 mL, Oral, Q4H PRN  Diclofenac Sodium, 2 g, Topical, 4x Daily PRN  diphenhydrAMINE, 25 mg, Intravenous, Q6H PRN  diphenhydrAMINE, 25 mg, Oral, Q6H PRN  lidocaine, , Topical, 4x Daily PRN  melatonin, 6 mg, Oral, HS PRN  ondansetron, 4 mg, Intravenous, Q6H PRN  oxyCODONE, 10 mg, Oral, Q6H PRN  oxyCODONE, 5 mg, Oral, Q6H PRN        None    Network Utilization Review Department  ATTENTION: Please call with any questions or concerns to [phone] and carefully listen to the prompts so that you are directed to the right person. All voicemails are confidential.  Fareed Calderon all requests for admission clinical reviews, approved or denied determinations and any other requests to dedicated fax number below belonging to the campus where the patient is receiving treatment.  List of dedicated fax numbers for the Facilities:  [place] DENIALS (Administrative/Medical Necessity) [phone] [address] (Maternity/NICU/Pediatrics) [phone]   [address] [place] [address] 1521 [facility] 1000 [facility] [phone]   [address] [place] [address] [phone]   [address] [address] [phone]

## 2023-09-26 NOTE — H&P
[facility]  H&P  Name: Minesh Archer 43 y.o. female I MRN: [id number]  Unit/Bed#: S -01 I Date of Admission: 9/25/2023   Date of Service: 9/26/2023 I Hospital Day: 1      Assessment/Plan   * Pneumonia  Assessment & Plan  · GGO mostly the right lung on CTA PE, could represent atypical PNA vs pulm edema from fluid overload  · Given recent hospitalization, Plaqunil use, and intubation for surgery on 9/19 would treat for PNA now and monitor respiratory status  · Rocephin 2 G q24h   · Pulm toilet  · Symptomatic treatment  · Covid/flu RSV negative  · Fu on CT chest as OP eventually to monitor for resolution    Fluid overload  Assessment & Plan  · Fluid overload likely from iatrogenic IVFs during recent hospitalization/ surgery  · BNP wnl doubt CHF given lack of risk factors  · Weight was 231 on dc from [facility]- now 244  · Daily weights  · Good urine output with 1 dose IV lasix in the ED  · Give 1 more dose tomorrow morning    Allergy  Assessment & Plan  · H/o anaphylaxis to Toradol in the PACU after her thoracic outlet surgery  · Cont home antihistamine formulary substitute  · Benadryl prn    GABBY (obstructive sleep apnea)  Assessment & Plan  · CPAP qhs    Asthma  Assessment & Plan  · Not acutely exacerbated  · Albuterol inhaler prn  · Cont home Singulair and Symbicort    Atelectasis  Assessment & Plan  · OOB as tolerated  · Incentive spirometer    Sepsis ([address])  Assessment & Plan  · Tachycardia, leukocytosis  · Procal pending  · Await urine antigens, blood cultures and cont treatment for PNA with Rocephin  · No IVFs given concomitant fluid overload  · Monitor Bps with Lasxi administration    History of recent hospitalization  Assessment & Plan  · Hospitalized at [facility] for scheduled thoracic outlet surgery 9/19/2023 - 9/22/2023  · Cont home pain regimen: oxy, gabapentin, flexeril    Leukocytosis  Assessment & Plan  · Patient reports she always has leukocytosis, appears her baseline is around 12? · Could be from smoking  · Monitor CBC tomorrow with current treatment for PNA    Tobacco abuse  Assessment & Plan  · NRT    ADHD (attention deficit hyperactivity disorder)  Assessment & Plan  · Cont formulary sub for Home adderrall       VTE Pharmacologic Prophylaxis: VTE Score: 4 Moderate Risk (Score 3-4) - Pharmacological DVT Prophylaxis Ordered: enoxaparin (Lovenox). Code Status: Level 1 - Full Code   Discussion with family: pt alone. Anticipated Length of Stay: Patient will be admitted on an inpatient basis with an anticipated length of stay of greater than 2 midnights secondary to iv abx, montior respiratory status, trend labs, iv lasix. Total Time Spent on Date of Encounter in care of patient: 55 mins. This time was spent on one or more of the following: performing physical exam; counseling and coordination of care; obtaining or reviewing history; documenting in the medical record; reviewing/ordering tests, medications or procedures; communicating with other healthcare professionals and discussing with patient's family/caregivers. Chief Complaint: SOB x 2 days    History of Present Illness:  Radha Short is a 43 y.o. female with a PMH of asthma, thoracic outlet sdr s/p REMOVAL 1ST/CERVICAL LWW 8/61/0412 complicated by anaphylaxis to Toradol, ADHD, obesity, GABBY who presents with SOB x 2 days. Patient reports on dc from [address] [place] [address] on 9/22/2023 she had non-productive coughing fits. Benadryl helped. Thought she was having an asthma attack but tried home nebulizer and even some steroid that her daughter had left over but her coughing and PRATHER worsened. Had malaise, too, anorexia x 2 days. Notes swelling in her arms and pitting edema in bl le which is not normal for her. Had PRATHER walking up her steps, was trying to use incentive spirometer at home but only able to get to 500.  Last night she had orthopnea while trying to use her CPAP and it felt like someone was sitting on her chest. + chest tightness but she "doesn't hear wheezing." Today she had visiting RN to the home and he thought her tongue was swollen so she decided to come to the ED. Denies CP with exertion. Denies pre-syncope, measured fever or chills. Denies hemoptysis. Review of Systems:  Review of Systems   Constitutional: Positive for appetite change. HENT: Negative for sore throat and trouble swallowing. Eyes: Negative. Respiratory: Positive for cough, chest tightness and shortness of breath. Cardiovascular: Positive for leg swelling. Gastrointestinal: Negative for vomiting. Endocrine: Negative. Genitourinary: Negative. Musculoskeletal:        B larm swelling   Skin: Positive for wound. Negative for color change. Allergic/Immunologic: Negative. Neurological: Negative for weakness. Hematological: Negative. Psychiatric/Behavioral: Negative for confusion. Past Medical and Surgical History:   Past Medical History:   Diagnosis Date   • Allergic rhinitis    • Asthma    • Psychiatric disorder        Past Surgical History:   Procedure Laterality Date   • LAPAROSCOPIC ENDOMETRIOSIS FULGURATION     • TONSILLECTOMY     • WRIST SURGERY Right     wrist shattered / bone graft       Meds/Allergies:  Prior to Admission medications    Medication Sig Start Date End Date Taking?  Authorizing Provider   albuterol (PROVENTIL HFA,VENTOLIN HFA) 90 mcg/act inhaler Inhale 2 puffs every 6 (six) hours as needed for wheezing   Yes Historical Provider, MD   amphetamine-dextroamphetamine (ADDERALL XR) 20 MG 24 hr capsule Take 20 mg by mouth every morning   Yes Historical Provider, MD   hydroxychloroquine (PLAQUENIL) 200 mg tablet TAKE 1 TABLET (200 MG TOTAL) BY MOUTH 2 TIMES A DAY. 2/15/22  Yes Historical Provider, MD   Symbicort 160-4.5 MCG/ACT inhaler TAKE 2 PUFFS BY MOUTH TWICE A DAY IN THE MORNING AND IN THE EVENING 3/1/22  Yes Historical Provider, MD   naproxen (NAPROSYN) 500 mg tablet Take 1 tablet (500 mg total) by mouth 2 (two) times a day with meals for 10 days 3/4/22 3/14/22  Rolando Vides MD     I have reviewed home medications with patient personally. Allergies: Allergies   Allergen Reactions   • Ketorolac Angioedema     Pt with respiratory distress and angioedema of tongue following administration of ketorolac in PACU   • Latex Other (See Comments) and Shortness Of Breath     Patient states it has been a long time but she vaguely remembers having difficulty breathing and swelling up   • Effexor [Venlafaxine]    • Viibryd [Vilazodone Hcl] Other (See Comments)     "passed out and was unresponsive"   • Chlorhexidine Hives and Rash     Hives following chlorhexidine prep with nerve block. Hives following chlorhexidine prep with nerve block.           Social History:  Marital Status: Single   Occupation: non-contributory  Patient Pre-hospital Living Situation: Home  Patient Pre-hospital Level of Mobility: walks  Patient Pre-hospital Diet Restrictions: none  Substance Use History:   Social History     Substance and Sexual Activity   Alcohol Use No     Social History     Tobacco Use   Smoking Status Every Day   • Packs/day: 0.25   • Years: 8.00   • Total pack years: 2.00   • Types: Cigarettes   Smokeless Tobacco Never   Tobacco Comments    patient on chantix to quit     Social History     Substance and Sexual Activity   Drug Use No       Family History:  Family History   Problem Relation Age of Onset   • Diabetes Mother    • Hypertension Mother    • Hyperlipidemia Father    • Hypertension Father    • Arthritis Paternal Grandfather    • Osteoporosis Paternal Grandfather        Physical Exam:     Vitals:   Blood Pressure: 158/88 (09/25/23 2220)  Pulse: (!) 106 (09/25/23 2220)  Temperature: 99.2 °F (37.3 °C) (09/25/23 2220)  Temp Source: Oral (09/25/23 2220)  Respirations: 18 (09/25/23 2220)  Height: 5' 3" (160 cm) (09/25/23 1159)  Weight - Scale: 111 kg (244 lb 14.9 oz) (09/25/23 1159)  SpO2: 94 % (09/25/23 2220)    Physical Exam  Vitals and nursing note reviewed. Constitutional:       General: She is not in acute distress. Appearance: She is ill-appearing. Comments: Flushed in face and chest   HENT:      Head: Normocephalic and atraumatic. Nose: Nose normal.      Mouth/Throat:      Mouth: Mucous membranes are moist.   Eyes:      Conjunctiva/sclera: Conjunctivae normal.   Neck:      Comments: Thick neck    Left side scar healing well no discharge or excessive tenderness to palp  Cardiovascular:      Rate and Rhythm: Regular rhythm. Tachycardia present. Pulses: Normal pulses. Pulmonary:      Effort: No respiratory distress. Comments: Very diminished breath sounds bl    No wheezing or rales appreciated  Abdominal:      General: Abdomen is flat. Musculoskeletal:      Cervical back: Neck supple. Comments: 1+ pitting edema bl legs, non pitting edema bl arms   Skin:     General: Skin is warm. Comments: No hives   Neurological:      General: No focal deficit present. Mental Status: She is alert.    Psychiatric:         Behavior: Behavior normal.          Additional Data:     Lab Results:  Results from last 7 days   Lab Units 09/25/23  1251   WBC Thousand/uL 18.66*   HEMOGLOBIN g/dL 11.5   HEMATOCRIT % 36.1   PLATELETS Thousands/uL 311   NEUTROS PCT % 66   LYMPHS PCT % 16   MONOS PCT % 10   EOS PCT % 6     Results from last 7 days   Lab Units 09/25/23  1251   SODIUM mmol/L 138   POTASSIUM mmol/L 3.7   CHLORIDE mmol/L 104   CO2 mmol/L 25   BUN mg/dL 8   CREATININE mg/dL 0.68   ANION GAP mmol/L 9   CALCIUM mg/dL 9.1   GLUCOSE RANDOM mg/dL 91     Results from last 7 days   Lab Units 09/25/23  1251   INR  0.90             Results from last 7 days   Lab Units 09/25/23  1447 09/25/23  1251   LACTIC ACID mmol/L 1.2  --    PROCALCITONIN ng/ml  --  0.07       Lines/Drains:  Invasive Devices     Peripheral Intravenous Line  Duration           Peripheral IV 09/25/23 Left Antecubital <1 day    Peripheral IV 09/25/23 Right Antecubital <1 day                    Imaging: Reviewed radiology reports from this admission including: cta pe, vas duplex and Personally reviewed the following imaging: chest xray & compared it to the CXR from [facility] [address] from 9/19/2023  CTA ED chest PE Study   ED Interpretation by Roxy Hou PA-C (09/25 7559)   CTA ED chest PE Study  Status: Final result    PACS Images     Show images for CTA ED chest PE Study  Study Result    Narrative & Impression  CTA - CHEST WITH IV CONTRAST - PULMONARY ANGIOGRAM     INDICATION:   dyspnea. Recent surgery left clavicle.     COMPARISON: December 20, 2016     TECHNIQUE: CTA examination of the chest was performed using angiographic technique according to a protocol specifically tailored to evaluate for pulmonary embolism. Multiplanar 2D reformatted images were created from the source data. In addition,   coronal 3D MIP postprocessing was performed on the acquisition scanner.     Radiation dose length product (DLP) for this visit:  693 mGy-cm . This examination, like all CT scans performed in the [facility], was performed utilizing techniques to minimize radiation dose exposure, including the use of iterative   reconstruction and automated exposure control.     IV Contrast:  100 mL of iohexol (OMNIPAQUE)     FINDINGS:     PULMONARY ARTERIAL    TREE:  No pulmonary embolus is seen.     LUNGS:   There is no tracheal or endobronchial lesion.     Ill-defined groundglass opacity in the right lung apex. Additional ill-defined groundglass opacities in the periphery of the right upper lobe and right lower lobe. No consolidation.     PLEURA: Small left pleural effusion. Left apical and basilar atelectasis. Scattered scarring     HEART/GREAT VESSELS:  Unremarkable for patient's age.  No thoracic aortic aneurysm.     MEDIASTINUM AND CORNELL:  Unremarkable.     CHEST WALL AND LOWER NECK:   Postop changes in the region of the left clavicle compatible with known recent surgery.     VISUALIZED STRUCTURES IN THE UPPER ABDOMEN: Fatty infiltration of liver. Contracted gallbladder. Unremarkable spleen with accessory splenule. Low-lying right kidney not visualized. Left kidney unremarkable. Adrenals unremarkable. Pancreas unremarkable. Atypical spigelian hernia extending between transversalis muscles and lower ribs only partially imaged.     OSS   EOUS STRUCTURES:  No acute fracture or destructive osseous lesion.        IMPRESSION:  No central pulmonary embolus. No right heart strain. Groundglass opacities in the periphery of the right lung most pronounced at the right lung apex (no COVID detected). Atelectatic changes both lungs most pronounced at the left lung base.                 Workstation performed: [id number]       Imaging    CTA ED chest PE Study (Order: [id number]) - 9/25/2023    Result History    CTA ED chest PE Study (Order #[id number]) on 9/25/2023 - Order Result History Report    Order Report    Order Details  Order Questions    Question Answer Comment  Does the patient have renal dysfunction? (Based on GFR Value) > 60 or young healthy patient w/ no clinical reason to suspect renal dysfunction   Proceed to CT PE Evaluation. Order can be completed and signed   Pregnant? No   What is the patient's sedation requirement? If Medication for Claustrophobia is selected, order medication at this point. No Sedation   Contras   t Information: IV ONLY   Note: If this is a PO only order, please change to a CT ABDOMEN PELVIS WO  Did the patient ever have a reaction to x-ray dye? If yes, please verify the type of allergy and order the contrast allergy prep. No   Note: If yes, please verify the type of allergy and order the contrast allergy prep. Did you complete and document the Wells' Score?  If "No", Please use the link below to complete the score Yes 4.5  Exam reason dyspnea   Note: Enter reason for exam  Decision Support Exception Emergency Medical Condition (MA)          Result Information    Status Priority Source  Final result (9/25/2023 1632) STAT     Other Results from 9/25/2023     HS Troponin I 2hr  Final result 9/25/2023   Blood culture  In process 9/25/2023   Lactic acid, plasma (w/reflex if result > 2.0)  Final result 9/25/2023   Blood culture  In process 9/25/2023   UA w Reflex to Microscopic w Reflex to Culture  Final result 9/25/2023   CBC and differential  Final result 9/25/2023   Protime-INR     Final result 9/25/2023   APTT  Final result 7/94/3023   Basic metabolic panel  Final result 9/25/2023   HS Troponin 0hr (reflex protocol)  Final result 9/25/2023   B-Type Natriuretic Peptide(BNP)  Final result 9/25/2023   important suggestion  Warning: Additional results from 9/25/2023 are available but are not displayed in this report. Reason for Exam    dyspnea        CTA ED chest PE Study: Patient Communication    Add Comments  Not seen        Signed by    Signed Date/Time  Phone Pager  Alfa Dueñas 9/25/2023 16:32 [phone]       Exam Information    Status Exam Begun  Exam Ended  Performing Tech  Final [99] 9/25/2023 15:01 9/25/2023 15:09 Cre[employer]ie Pages      Screening Form Questions    No questions have been answered for this form. External Results Report      Open External Results Report     Encounter      View Encounter                 Patient Care Timeline    No data selected in time range    Pre-op Summary      Pre-op            Recovery Summary      Recovery                Routing History    None            Final Result by Alfa Dueñas, DO (09/25 1632)   No central pulmonary embolus. No right heart strain. Groundglass opacities in the periphery of the right lung most pronounced at the right lung apex (no COVID detected). Atelectatic changes both lungs most pronounced at the left lung base.                   Workstation performed: [id number]         VAS lower limb venous duplex study, complete bilateral   Final Result by Nydia Diaz Perilta Garcias MD (09/25 4821)      XR chest 1 view portable   ED Interpretation by Governor NATALIA Castillo (09/25 1304)   For inspiration, mild CHF, cardiomegaly, questionable left pleural effusion      Final Result by Aleks Waters MD (09/25 8748)      Bilateral  opacities could represent pulmonary edema or atypical pneumonia in the appropriate clinical setting. Left-sided pleural effusion                  Resident: Emerson Crouch I, the attending radiologist, have reviewed the images and agree with the final report above. Workstation performed: [id number]             EKG and Other Studies Reviewed on Admission:   · EKG: Sinus Tachycardia. . ** Please Note: This note has been constructed using a voice recognition system.  **

## 2023-09-26 NOTE — ASSESSMENT & PLAN NOTE
· Patient reports she always has leukocytosis, appears her baseline is around 12?   · Could be from smoking  · Monitor CBC tomorrow with current treatment for PNA

## 2023-09-26 NOTE — ASSESSMENT & PLAN NOTE
· Fluid overload likely from iatrogenic IVFs during recent hospitalization/ surgery  · BNP wnl doubt CHF given lack of risk factors  · Weight was 231 on dc from [facility]- now 244  · Daily weights  · Good urine output with 1 dose IV lasix in the ED  · I/O  · Daily weights

## 2023-09-26 NOTE — ASSESSMENT & PLAN NOTE
· Tachycardia, leukocytosis  · Procal pending  · Await urine antigens, blood cultures and cont treatment for PNA with Rocephin  · No IVFs given concomitant fluid overload  · Monitor Bps with Lasxi administration

## 2023-09-26 NOTE — ASSESSMENT & PLAN NOTE
· Tachycardia, leukocytosis- improving  · Procal pending  · Await urine antigens, blood cultures and cont treatment for PNA with Rocephin  · No IVFs given concomitant fluid overload

## 2023-09-27 ENCOUNTER — APPOINTMENT (INPATIENT)
Dept: CT IMAGING | Facility: HOSPITAL | Age: 38
DRG: 205 | End: 2023-09-27
Payer: COMMERCIAL

## 2023-09-27 PROBLEM — E87.70 FLUID OVERLOAD: Status: RESOLVED | Noted: 2023-09-25 | Resolved: 2023-09-27

## 2023-09-27 PROBLEM — G89.18 POSTOPERATIVE PAIN: Status: ACTIVE | Noted: 2023-09-27

## 2023-09-27 PROBLEM — R07.9 CHEST PAIN: Status: ACTIVE | Noted: 2023-09-27

## 2023-09-27 LAB
2HR DELTA HS TROPONIN: 1 NG/L
ALBUMIN SERPL BCP-MCNC: 4.1 G/DL (ref 3.5–5)
ALP SERPL-CCNC: 65 U/L (ref 34–104)
ALT SERPL W P-5'-P-CCNC: 42 U/L (ref 7–52)
ANION GAP SERPL CALCULATED.3IONS-SCNC: 8 MMOL/L
AST SERPL W P-5'-P-CCNC: 25 U/L (ref 13–39)
BASOPHILS # BLD MANUAL: 0 THOUSAND/UL (ref 0–0.1)
BASOPHILS NFR MAR MANUAL: 0 % (ref 0–1)
BILIRUB SERPL-MCNC: 0.31 MG/DL (ref 0.2–1)
BUN SERPL-MCNC: 11 MG/DL (ref 5–25)
CALCIUM SERPL-MCNC: 9.5 MG/DL (ref 8.4–10.2)
CARDIAC TROPONIN I PNL SERPL HS: 2 NG/L
CARDIAC TROPONIN I PNL SERPL HS: 3 NG/L
CHLORIDE SERPL-SCNC: 104 MMOL/L (ref 96–108)
CO2 SERPL-SCNC: 24 MMOL/L (ref 21–32)
CREAT SERPL-MCNC: 0.74 MG/DL (ref 0.6–1.3)
D DIMER PPP FEU-MCNC: 1.57 UG/ML FEU
EOSINOPHIL # BLD MANUAL: 0.17 THOUSAND/UL (ref 0–0.4)
EOSINOPHIL NFR BLD MANUAL: 1 % (ref 0–6)
ERYTHROCYTE [DISTWIDTH] IN BLOOD BY AUTOMATED COUNT: 13.8 % (ref 11.6–15.1)
GFR SERPL CREATININE-BSD FRML MDRD: 102 ML/MIN/1.73SQ M
GLUCOSE SERPL-MCNC: 163 MG/DL (ref 65–140)
GLUCOSE SERPL-MCNC: 174 MG/DL (ref 65–140)
HCT VFR BLD AUTO: 39.9 % (ref 34.8–46.1)
HGB BLD-MCNC: 12.3 G/DL (ref 11.5–15.4)
LYMPHOCYTES # BLD AUTO: 1.73 THOUSAND/UL (ref 0.6–4.47)
LYMPHOCYTES # BLD AUTO: 10 % (ref 14–44)
MCH RBC QN AUTO: 26.7 PG (ref 26.8–34.3)
MCHC RBC AUTO-ENTMCNC: 30.8 G/DL (ref 31.4–37.4)
MCV RBC AUTO: 87 FL (ref 82–98)
MONOCYTES # BLD AUTO: 0.17 THOUSAND/UL (ref 0–1.22)
MONOCYTES NFR BLD: 1 % (ref 4–12)
NEUTROPHILS # BLD MANUAL: 15.22 THOUSAND/UL (ref 1.85–7.62)
NEUTS BAND NFR BLD MANUAL: 1 % (ref 0–8)
NEUTS SEG NFR BLD AUTO: 87 % (ref 43–75)
PLATELET # BLD AUTO: 411 THOUSANDS/UL (ref 149–390)
PLATELET BLD QL SMEAR: ADEQUATE
PMV BLD AUTO: 9.2 FL (ref 8.9–12.7)
POTASSIUM SERPL-SCNC: 4.3 MMOL/L (ref 3.5–5.3)
PROT SERPL-MCNC: 7.2 G/DL (ref 6.4–8.4)
RBC # BLD AUTO: 4.6 MILLION/UL (ref 3.81–5.12)
RBC MORPH BLD: NORMAL
SODIUM SERPL-SCNC: 136 MMOL/L (ref 135–147)
WBC # BLD AUTO: 17.29 THOUSAND/UL (ref 4.31–10.16)

## 2023-09-27 PROCEDURE — 99222 1ST HOSP IP/OBS MODERATE 55: CPT | Performed by: PHYSICIAN ASSISTANT

## 2023-09-27 PROCEDURE — 85027 COMPLETE CBC AUTOMATED: CPT | Performed by: INTERNAL MEDICINE

## 2023-09-27 PROCEDURE — G1004 CDSM NDSC: HCPCS

## 2023-09-27 PROCEDURE — 85007 BL SMEAR W/DIFF WBC COUNT: CPT | Performed by: INTERNAL MEDICINE

## 2023-09-27 PROCEDURE — 99232 SBSQ HOSP IP/OBS MODERATE 35: CPT | Performed by: INTERNAL MEDICINE

## 2023-09-27 PROCEDURE — 84484 ASSAY OF TROPONIN QUANT: CPT | Performed by: INTERNAL MEDICINE

## 2023-09-27 PROCEDURE — 85379 FIBRIN DEGRADATION QUANT: CPT | Performed by: INTERNAL MEDICINE

## 2023-09-27 PROCEDURE — 80053 COMPREHEN METABOLIC PANEL: CPT | Performed by: INTERNAL MEDICINE

## 2023-09-27 PROCEDURE — 82948 REAGENT STRIP/BLOOD GLUCOSE: CPT

## 2023-09-27 PROCEDURE — 71275 CT ANGIOGRAPHY CHEST: CPT

## 2023-09-27 RX ORDER — METHYLPREDNISOLONE SODIUM SUCCINATE 40 MG/ML
40 INJECTION, POWDER, LYOPHILIZED, FOR SOLUTION INTRAMUSCULAR; INTRAVENOUS ONCE
Status: COMPLETED | OUTPATIENT
Start: 2023-09-27 | End: 2023-09-27

## 2023-09-27 RX ORDER — NYSTATIN 100000 [USP'U]/G
POWDER TOPICAL 2 TIMES DAILY
Status: DISCONTINUED | OUTPATIENT
Start: 2023-09-27 | End: 2023-09-29 | Stop reason: HOSPADM

## 2023-09-27 RX ORDER — CYCLOBENZAPRINE HCL 10 MG
5 TABLET ORAL 3 TIMES DAILY
Status: DISCONTINUED | OUTPATIENT
Start: 2023-09-27 | End: 2023-09-29 | Stop reason: HOSPADM

## 2023-09-27 RX ORDER — OXYCODONE HYDROCHLORIDE 5 MG/1
5 TABLET ORAL EVERY 8 HOURS PRN
Status: DISCONTINUED | OUTPATIENT
Start: 2023-09-27 | End: 2023-09-27

## 2023-09-27 RX ORDER — MONTELUKAST SODIUM 10 MG/1
10 TABLET ORAL
Status: DISCONTINUED | OUTPATIENT
Start: 2023-09-27 | End: 2023-09-29 | Stop reason: HOSPADM

## 2023-09-27 RX ORDER — OXYCODONE HYDROCHLORIDE 5 MG/1
5 TABLET ORAL EVERY 4 HOURS PRN
Status: DISCONTINUED | OUTPATIENT
Start: 2023-09-27 | End: 2023-09-29 | Stop reason: HOSPADM

## 2023-09-27 RX ORDER — PANTOPRAZOLE SODIUM 40 MG/1
40 TABLET, DELAYED RELEASE ORAL
Status: DISCONTINUED | OUTPATIENT
Start: 2023-09-27 | End: 2023-09-29 | Stop reason: HOSPADM

## 2023-09-27 RX ADMIN — NYSTATIN: 100000 POWDER TOPICAL at 08:21

## 2023-09-27 RX ADMIN — NYSTATIN 500000 UNITS: 100000 SUSPENSION ORAL at 08:24

## 2023-09-27 RX ADMIN — NYSTATIN 500000 UNITS: 100000 SUSPENSION ORAL at 12:04

## 2023-09-27 RX ADMIN — NYSTATIN: 100000 POWDER TOPICAL at 17:36

## 2023-09-27 RX ADMIN — NYSTATIN 500000 UNITS: 100000 SUSPENSION ORAL at 17:34

## 2023-09-27 RX ADMIN — OXYCODONE HYDROCHLORIDE 5 MG: 5 TABLET ORAL at 04:47

## 2023-09-27 RX ADMIN — OXYCODONE HYDROCHLORIDE 5 MG: 5 TABLET ORAL at 12:04

## 2023-09-27 RX ADMIN — DIPHENHYDRAMINE HYDROCHLORIDE 25 MG: 50 INJECTION, SOLUTION INTRAMUSCULAR; INTRAVENOUS at 00:19

## 2023-09-27 RX ADMIN — ACETAMINOPHEN 650 MG: 325 TABLET, FILM COATED ORAL at 08:24

## 2023-09-27 RX ADMIN — Medication 6 MG: at 21:44

## 2023-09-27 RX ADMIN — PANTOPRAZOLE SODIUM 40 MG: 40 TABLET, DELAYED RELEASE ORAL at 04:40

## 2023-09-27 RX ADMIN — NYSTATIN 500000 UNITS: 100000 SUSPENSION ORAL at 00:44

## 2023-09-27 RX ADMIN — CYCLOBENZAPRINE 5 MG: 10 TABLET, FILM COATED ORAL at 21:44

## 2023-09-27 RX ADMIN — GABAPENTIN 300 MG: 300 CAPSULE ORAL at 08:19

## 2023-09-27 RX ADMIN — METHYLPREDNISOLONE SODIUM SUCCINATE 40 MG: 40 INJECTION, POWDER, FOR SOLUTION INTRAMUSCULAR; INTRAVENOUS at 16:30

## 2023-09-27 RX ADMIN — OXYCODONE HYDROCHLORIDE 5 MG: 5 TABLET ORAL at 21:44

## 2023-09-27 RX ADMIN — ENOXAPARIN SODIUM 40 MG: 40 INJECTION SUBCUTANEOUS at 21:44

## 2023-09-27 RX ADMIN — HYDROXYCHLOROQUINE SULFATE 200 MG: 200 TABLET, FILM COATED ORAL at 17:36

## 2023-09-27 RX ADMIN — GABAPENTIN 300 MG: 300 CAPSULE ORAL at 17:34

## 2023-09-27 RX ADMIN — GABAPENTIN 300 MG: 300 CAPSULE ORAL at 21:44

## 2023-09-27 RX ADMIN — CYCLOBENZAPRINE 5 MG: 10 TABLET, FILM COATED ORAL at 00:19

## 2023-09-27 RX ADMIN — NYSTATIN 500000 UNITS: 100000 SUSPENSION ORAL at 21:44

## 2023-09-27 RX ADMIN — BUDESONIDE AND FORMOTEROL FUMARATE DIHYDRATE 2 PUFF: 160; 4.5 AEROSOL RESPIRATORY (INHALATION) at 17:36

## 2023-09-27 RX ADMIN — CYCLOBENZAPRINE 5 MG: 10 TABLET, FILM COATED ORAL at 08:24

## 2023-09-27 RX ADMIN — CYCLOBENZAPRINE 5 MG: 10 TABLET, FILM COATED ORAL at 17:35

## 2023-09-27 RX ADMIN — MONTELUKAST 10 MG: 10 TABLET, FILM COATED ORAL at 21:44

## 2023-09-27 RX ADMIN — ENOXAPARIN SODIUM 40 MG: 40 INJECTION SUBCUTANEOUS at 08:19

## 2023-09-27 RX ADMIN — METHYLPREDNISOLONE SODIUM SUCCINATE 40 MG: 40 INJECTION, POWDER, FOR SOLUTION INTRAMUSCULAR; INTRAVENOUS at 08:19

## 2023-09-27 RX ADMIN — IOHEXOL 85 ML: 350 INJECTION, SOLUTION INTRAVENOUS at 17:24

## 2023-09-27 RX ADMIN — OXYCODONE HYDROCHLORIDE 5 MG: 5 TABLET ORAL at 17:35

## 2023-09-27 RX ADMIN — BUDESONIDE AND FORMOTEROL FUMARATE DIHYDRATE 2 PUFF: 160; 4.5 AEROSOL RESPIRATORY (INHALATION) at 08:20

## 2023-09-27 RX ADMIN — HYDROXYCHLOROQUINE SULFATE 200 MG: 200 TABLET, FILM COATED ORAL at 08:20

## 2023-09-27 NOTE — ASSESSMENT & PLAN NOTE
· Tachycardia, leukocytosis- improving  · Procal pending  · Await urine antigens, blood cultures and cont treatment for PNA with Rocephin  · Dimer elevated   · PE study in ED negative however worsening chest pain and now on oxygen 2 L   · Will repeat CT study for completion   · Also place on telemetry and trend troponins

## 2023-09-27 NOTE — ASSESSMENT & PLAN NOTE
· H/o angioedema reaction from Toradol in the PACU after her thoracic outlet surgery vs idiopathic angioedema vs subjective globus sensation (in setting of known reflux and no PPI or control, exacerbated by intubation/surgery) vs asthma/ ?Samter's triad  · Start PPI  · Was seen by allergy service at [facility] and according to them nonspecific mast cell degranulation from opioids leading to tongue swelling was also on the differential. Minimize opioid use now. She should not need narcotics on dc from this hospitalization anyway  · Was provided with referral to Dr. Sana Woo who is an allergist/immunologist and is part of [facility].  She has plan to fu with allergy testing with him on Friday 9/29/2023  · Was dc from [facility] on on cetirizine 10 mg with the option of uptitration to 10 mg bid if that wasn't controlling her sxs however the attending commented that he did not think this medication would make a really great impact on her sxs  · Started IV solumedrol burst - decrease  · Now with rash as well   · Will get dermatology consult as the rash flared up without any NSAIDs

## 2023-09-27 NOTE — ASSESSMENT & PLAN NOTE
· CPAP qhs  · Of note, patient saw ENT as an outpatient following her initial difficult intubation back in May 2022. Flexible laryngoscopy performed w/o e/o mass or lesion and mild arytenoid edema suggestive of reflux. May benefit from diagnostic bronchoscopy as OP to take a look past the vocal cords. · H/o challenging intubation due to reactive airway with severe bronchospasm and copious secretions.

## 2023-09-27 NOTE — ASSESSMENT & PLAN NOTE
Scar area with some erythema   As per patient erythema worse since discharge from [address] [place] [address]    Her surgery was performed by the vascular surgery team there- will consult vascular here to see if they recommend any imaging

## 2023-09-27 NOTE — ASSESSMENT & PLAN NOTE
• Patient's with sleep apnea are at higher risk of respiratory depression secondary to opioid use. • Attempt to minimize use of opioid pain medication while maintaining adequate analgesia.

## 2023-09-27 NOTE — ASSESSMENT & PLAN NOTE
· Patient reports she always has leukocytosis, appears her baseline is around 12?   · Could be from smoking  · Monitor CBC tomorrow

## 2023-09-27 NOTE — ASSESSMENT & PLAN NOTE
· Hospitalized at [facility] for scheduled thoracic outlet surgery 9/19/2023 - 9/22/2023  · Cont home pain regimen: oxy, gabapentin, flexeril

## 2023-09-27 NOTE — ASSESSMENT & PLAN NOTE
· Tylenol 650 mg p.o. every 6 hours as needed mild pain. · Change Flexeril to 5 mg p.o. 3 times daily scheduled. · Gabapentin 300 mg p.o. 3 times daily scheduled. · Oxycodone 5 mg p.o. every 4 hours as needed moderate to severe pain. · Bowel regimen while on opioid pain medication to avoid opioid-induced constipation. · Ice to painful area for up to 20 minutes every hour as needed. · Encourage continuation of left arm movement to avoid stiffness and weakness which can lead to worsening pain. · At discharge, suggest the following:  · Tylenol 650 mg p.o. every 6 hours as needed mild pain. · Flexeril 5 mg p.o. 3 times daily as needed muscle spasm. · Gabapentin 300 mg p.o. 3 times daily. · Oxycodone 5 mg p.o. every 6 hours as needed moderate-severe pain x2 days, then discontinue. · Bowel regimen while on opioid pain medication.

## 2023-09-27 NOTE — UTILIZATION REVIEW
Continued Stay Review    Date: 9/27/23                         Current Patient Class: INPT  Current Level of Care: MED-SURG    HPI:38 y.o. female initially admitted on 9/25. Assessment/Plan: left shoulder/neck pain following left first rib excision. IV STEROID. ANALGESICS. GABAPENTIN. FLEXERIL. Encourage continuation of left arm movement to avoid stiffness and weakness which can lead to worsening pain.     Vital Signs:     09/27/23 0756 -- -- -- 144/80 -- -- -- -- -- --   09/27/23 07:48:37 97.9 °F (36.6 °C) 85 18 71/53 Abnormal  59 Abnormal  95 % 36 4 L/min Nasal cannula Lying   09/27/23 0300 -- 104 18 -- -- 97 % -- -- -- --   09/26/23 2039 99.3 °F (37.4 °C) 105 18 120/68 86 97 % -- -- None (Room air) Sitting   09/26/23 15:15:40 98.6 °F (37 °C) 101 18 144/73 97 -- -- -- -- --   09/26/23 13:12:29 -- 106 Abnormal  -- 140/81 101 93 % -- -- None (Room air) Sitting       Pertinent Labs/Diagnostic Results:       Results from last 7 days   Lab Units 09/27/23 0441 09/26/23  0556 09/25/23  1251   WBC Thousand/uL 17.29* 16.63* 18.66*   HEMOGLOBIN g/dL 12.3 11.5 11.5   HEMATOCRIT % 39.9 36.5 36.1   PLATELETS Thousands/uL 411* 375 311   NEUTROS ABS Thousands/µL  --  9.69* 12.26*   BANDS PCT % 1  --   --          Results from last 7 days   Lab Units 09/27/23 0441 09/26/23  0556 09/25/23  1251   SODIUM mmol/L 136 136 138   POTASSIUM mmol/L 4.3 3.7 3.7   CHLORIDE mmol/L 104 101 104   CO2 mmol/L 24 26 25   ANION GAP mmol/L 8 9 9   BUN mg/dL 11 10 8   CREATININE mg/dL 0.74 0.75 0.68   EGFR ml/min/1.73sq m 102 100 110   CALCIUM mg/dL 9.5 9.0 9.1     Results from last 7 days   Lab Units 09/27/23  0441 09/26/23  0556   AST U/L 25 26   ALT U/L 42 41   ALK PHOS U/L 65 61   TOTAL PROTEIN g/dL 7.2 6.6   ALBUMIN g/dL 4.1 3.9   TOTAL BILIRUBIN mg/dL 0.31 0.36     Results from last 7 days   Lab Units 09/27/23  0439   POC GLUCOSE mg/dl 163*     Results from last 7 days   Lab Units 09/27/23  0441 09/26/23  0556 09/25/23  1251   GLUCOSE RANDOM mg/dL 174* 89 91       Results from last 7 days   Lab Units 09/25/23  1700 09/25/23  1522 09/25/23  1251   HS TNI 0HR ng/L  --   --  4   HS TNI 2HR ng/L  --  4  --    HSTNI D2 ng/L  --  0  --    HS TNI 4HR ng/L 4  --   --    HSTNI D4 ng/L 0  --   --          Results from last 7 days   Lab Units 09/25/23  1251   PROTIME seconds 12.7   INR  0.90   PTT seconds 23         Results from last 7 days   Lab Units 09/26/23  0556 09/25/23  1251   PROCALCITONIN ng/ml 0.06 0.07     Results from last 7 days   Lab Units 09/25/23  1447   LACTIC ACID mmol/L 1.2             Results from last 7 days   Lab Units 09/25/23  1251   BNP pg/mL 26       Results from last 7 days   Lab Units 09/25/23  1337   CLARITY UA  Clear   COLOR UA  Light Yellow   SPEC GRAV UA  1.011   PH UA  7.0   GLUCOSE UA mg/dl Negative   KETONES UA mg/dl Negative   BLOOD UA  Negative   PROTEIN UA mg/dl Negative   NITRITE UA  Negative   BILIRUBIN UA  Negative   UROBILINOGEN UA (BE) mg/dl <2.0   LEUKOCYTES UA  Negative     Results from last 7 days   Lab Units 09/25/23  2301   STREP PNEUMONIAE ANTIGEN, URINE  Negative   LEGIONELLA URINARY ANTIGEN  Negative       Results from last 7 days   Lab Units 09/25/23  1447 09/25/23  1359   BLOOD CULTURE  No Growth at 24 hrs. No Growth at 24 hrs.        Medications:   Scheduled Medications:  amphetamine-dextroamphetamine, 10 mg, Oral, BID before breakfast/lunch  budesonide-formoterol, 2 puff, Inhalation, BID  enoxaparin, 40 mg, Subcutaneous, Q12H JEEVAN  gabapentin, 300 mg, Oral, TID  hydroxychloroquine, 200 mg, Oral, BID  methylPREDNISolone sodium succinate, 40 mg, Intravenous, Daily  montelukast, 10 mg, Oral, HS  nystatin, 500,000 Units, Swish & Swallow, 4x Daily  nystatin, , Topical, BID  pantoprazole, 40 mg, Oral, Early Morning      PRN Meds:  acetaminophen, 650 mg, Oral, Q6H PRN 9/26 X 1, 9/27 X 1  albuterol, 2 puff, Inhalation, Q6H PRN 9/26 X 2  aluminum-magnesium hydroxide-simethicone, 30 mL, Oral, Q6H PRN  cyclobenzaprine, 5 mg, Oral, TID PRN 9/25 X 1, 9/26 X 2, 9/27 X 2  dextromethorphan-guaiFENesin, 10 mL, Oral, Q4H PRN 9/25 X 1, 9/26 X 1  melatonin, 6 mg, Oral, HS PRN  ondansetron, 4 mg, Intravenous, Q6H PRN  oxyCODONE, 5 mg, Oral, Q8H PRN 9/26 X 1, 9/27 X 1      oxyCODONE (ROXICODONE) immediate release tablet 10 mg  Dose: 10 mg  Freq: Every 6 hours PRN Route: PO  PRN Reason: severe pain  Start: 09/25/23 2119 End: 09/27/23 0131   Admin Instructions:   [facility]           [facility]1 1983 [id number]     2009      0131-D/C'd          diphenhydrAMINE (BENADRYL) injection 25 mg  Dose: 25 mg  Freq: Every 6 hours PRN Route: IV  PRN Reasons: itching,allergies,pruritis  Indications of Use: ACUTE URTICARIA,ANAPHYLAXIS  Start: 09/26/23 0950 End: 09/27/23 0131   Admin Instructions: For I.V. administration, inject at a rate less than or equal to 25 mg/minute            1020     7488      0019     0131-D/C'd      diphenhydrAMINE (BENADRYL) tablet 25 mg  Dose: 25 mg  Freq: Every 6 hours PRN Route: PO  PRN Reasons: itching,allergies  Start: 09/25/23 2104 End: 09/27/23 0131            0125     0746      0131-D/C'd            Discharge Plan: [facility]    Network Utilization Review Department  ATTENTION: Please call with any questions or concerns to [phone] and carefully listen to the prompts so that you are directed to the right person. All voicemails are confidential.  Herlinda West all requests for admission clinical reviews, approved or denied determinations and any other requests to dedicated fax number below belonging to the campus where the patient is receiving treatment.  List of dedicated fax numbers for the Facilities:  [place] DENIALS (Administrative/Medical Necessity) [phone] [address] [facility] (Maternity/NICU/Pediatrics) [address] [place] [address] [address] [address] [facility] [address] [place] [address] [facility] [phone]

## 2023-09-27 NOTE — QUICK NOTE
I was called by the nurse to evaluate the patient as patient complained again of difficulty swallowing. Thus, I saw and evaluated the patient right away. Patient indeed complained of problems swallowing. On my examination of the patient's throat: No hyperemia, swelling/edema, or exudates noted on patient's tonsillopharyngeal and oral areas; no lip swelling; patient's pharynx was patent. On my chest and lung examination: Clear breath sounds bilaterally, normal respiratory effort, no wheezing noted. CVS: Normal rate with regular heart rhythm. Patient has a rash/erythema surrounding her scar on her left neck area. From my discussion with the patient, she agreed with me giving an extra dose of IV Solu-Medrol at this point, to reduce any possibility of further inflammation/allergic reaction, causing her having problems swallowing. She also agreed for me to place her on n.p.o.  Monitor patient closely. Speech therapy consult. I spoke to the nurse about the findings and plans.

## 2023-09-27 NOTE — PROGRESS NOTES
[address] [facility]  Progress Note  Name: Madhuri Givens  MRN: [id number]  Unit/Bed#: S -01 I Date of Admission: 9/25/2023   Date of Service: 9/27/2023 I Hospital Day: 2    Assessment/Plan   Chest pain  Assessment & Plan  Trop x 3  Telemetry   Dimer  -positive will get CT PE study and venous doppler       Postoperative pain  Assessment & Plan  Scar area with some erythema   As per patient erythema worse since discharge from [address] [place] [address]    Her surgery was performed by the vascular surgery team there- will consult vascular here to see if they recommend any imaging       Allergy  Assessment & Plan  · H/o angioedema reaction from Toradol in the PACU after her thoracic outlet surgery vs idiopathic angioedema vs subjective globus sensation (in setting of known reflux and no PPI or control, exacerbated by intubation/surgery) vs asthma/ ?Samter's triad  · Start PPI  · Was seen by allergy service at [facility] and according to them nonspecific mast cell degranulation from opioids leading to tongue swelling was also on the differential. Minimize opioid use now. She should not need narcotics on dc from this hospitalization anyway  · Was provided with referral to Dr. Eddie Mcgee who is an allergist/immunologist and is part of [facility]. She has plan to fu with allergy testing with him on Friday 9/29/2023  · Was dc from [facility] on on cetirizine 10 mg with the option of uptitration to 10 mg bid if that wasn't controlling her sxs however the attending commented that he did not think this medication would make a really great impact on her sxs  · Started IV solumedrol burst - decrease  · Now with rash as well   · Will get dermatology consult as the rash flared up without any NSAIDs    GABBY (obstructive sleep apnea)  Assessment & Plan  · CPAP qhs  · Of note, patient saw ENT as an outpatient following her initial difficult intubation back in May 2022.  Flexible laryngoscopy performed w/o e/o mass or lesion and mild arytenoid edema suggestive of reflux. May benefit from diagnostic bronchoscopy as OP to take a look past the vocal cords. · H/o challenging intubation due to reactive airway with severe bronchospasm and copious secretions. Asthma  Assessment & Plan  · Not acutely exacerbated  · Albuterol inhaler prn  · Cont home Singulair and Symbicort    Atelectasis  Assessment & Plan  · OOB as tolerated  · Incentive spirometer    Sepsis (HCC)  Assessment & Plan  · Tachycardia, leukocytosis- improving  · Procal pending  · Await urine antigens, blood cultures and cont treatment for PNA with Rocephin  · Dimer elevated   · PE study in ED negative however worsening chest pain and now on oxygen 2 L   · Will repeat CT study for completion   · Also place on telemetry and trend troponins       History of recent hospitalization  Assessment & Plan  · Hospitalized at [facility] for scheduled thoracic outlet surgery 9/19/2023 - 9/22/2023  · Cont home pain regimen: oxy, gabapentin, flexeril    Leukocytosis  Assessment & Plan  · Patient reports she always has leukocytosis, appears her baseline is around 12? · Could be from smoking  · Monitor CBC tomorrow    Tobacco abuse  Assessment & Plan  ·  NRT    ADHD (attention deficit hyperactivity disorder)  Assessment & Plan  · Cont formulary sub for Home adderrall    * Pneumonia  Assessment & Plan  · GGO mostly the right lung on CTA PE, could represent atypical PNA vs pulm edema from fluid overload  · Procal is negative x 2.  Dc abx and monitor sxs/ CBC  · Pulm toilet  · Symptomatic treatment  · Covid/flu RSV negative  · Fu on CT chest as OP eventually to monitor for resolution  · Procal x 2 is negative   · Will DC abx  · But given hypoxia and ongoing symptoms will consult pulmonology   · Come down on steroids    Fluid overload-resolved as of 9/27/2023  Assessment & Plan  · Fluid overload likely from iatrogenic IVFs during recent hospitalization/ surgery  · BNP wnl doubt CHF given lack of risk factors  · Resolved with 2 doses IV lasix  · Weight is back to baseline               VTE Pharmacologic Prophylaxis: VTE Score: 4 Moderate Risk (Score 3-4) - Pharmacological DVT Prophylaxis Ordered: enoxaparin (Lovenox). Patient Centered Rounds: I performed bedside rounds with nursing staff today. Discussions with Specialists or Other Care Team Provider: Discussed with nursing and CM. Education and Discussions with Family / Patient: Updated  () at bedside. Total Time Spent on Date of Encounter in care of patient: 30  mins. This time was spent on one or more of the following: performing physical exam; counseling and coordination of care; obtaining or reviewing history; documenting in the medical record; reviewing/ordering tests, medications or procedures; communicating with other healthcare professionals and discussing with patient's family/caregivers. Current Length of Stay: 2 day(s)  Current Patient Status: Inpatient   Certification Statement: The patient will continue to require additional inpatient hospital stay due to hypoxia chest pain, rash   Discharge Plan: Anticipate discharge in 24-48 hrs to home. Code Status: Level 1 - Full Code    Subjective:   Patient seen and examined   Feeling "not good"  States she is having worsening chest pain and shortness of breath     Objective:     Vitals:   Temp (24hrs), Av.7 °F (37.1 °C), Min:97.9 °F (36.6 °C), Max:99.3 °F (37.4 °C)    Temp:  [97.9 °F (36.6 °C)-99.3 °F (37.4 °C)] 99 °F (37.2 °C)  HR:  [] 113  Resp:  [17-18] 17  BP: ()/(53-84) 136/84  SpO2:  [94 %-97 %] 94 %  Body mass index is 40.93 kg/m². Input and Output Summary (last 24 hours):   No intake or output data in the 24 hours ending 23 1528    Physical Exam:   Physical Exam  Constitutional:       General: She is not in acute distress. Appearance: She is not ill-appearing, toxic-appearing or diaphoretic. HENT:      Head: Normocephalic. Eyes:      Pupils: Pupils are equal, round, and reactive to light. Cardiovascular:      Rate and Rhythm: Tachycardia present. Heart sounds: No murmur heard. No friction rub. No gallop. Pulmonary:      Effort: No respiratory distress. Breath sounds: No stridor. No wheezing, rhonchi or rales. Comments: Poor effort     Chest:      Chest wall: No tenderness. Abdominal:      General: Abdomen is flat. There is no distension. Palpations: There is no mass. Tenderness: There is no abdominal tenderness. There is no right CVA tenderness, left CVA tenderness, guarding or rebound. Hernia: No hernia is present. Musculoskeletal:      Right lower leg: No edema. Left lower leg: No edema. Skin:     Capillary Refill: Capillary refill takes less than 2 seconds. Coloration: Skin is pale. Skin is not jaundiced. Findings: Erythema (surrounding scar) and rash present. No bruising or lesion. Neurological:      General: No focal deficit present. Mental Status: She is alert. Cranial Nerves: No cranial nerve deficit. Sensory: No sensory deficit. Motor: No weakness.       Coordination: Coordination normal.      Gait: Gait normal.   Psychiatric:         Mood and Affect: Mood normal.          Additional Data:     Labs:  Results from last 7 days   Lab Units 09/27/23  0441 09/26/23  0556   WBC Thousand/uL 17.29* 16.63*   HEMOGLOBIN g/dL 12.3 11.5   HEMATOCRIT % 39.9 36.5   PLATELETS Thousands/uL 411* 375   BANDS PCT % 1  --    NEUTROS PCT %  --  58   LYMPHS PCT %  --  20   LYMPHO PCT % 10*  --    MONOS PCT %  --  12   MONO PCT % 1*  --    EOS PCT % 1 7*     Results from last 7 days   Lab Units 09/27/23  0441   SODIUM mmol/L 136   POTASSIUM mmol/L 4.3   CHLORIDE mmol/L 104   CO2 mmol/L 24   BUN mg/dL 11   CREATININE mg/dL 0.74   ANION GAP mmol/L 8   CALCIUM mg/dL 9.5   ALBUMIN g/dL 4.1   TOTAL BILIRUBIN mg/dL 0.31   ALK PHOS U/L 65   ALT U/L 42   AST U/L 25   GLUCOSE RANDOM mg/dL 174*     Results from last 7 days   Lab Units 09/25/23  1251   INR  0.90     Results from last 7 days   Lab Units 09/27/23  0439   POC GLUCOSE mg/dl 163*         Results from last 7 days   Lab Units 09/26/23  0556 09/25/23  1447 09/25/23  1251   LACTIC ACID mmol/L  --  1.2  --    PROCALCITONIN ng/ml 0.06  --  0.07       Lines/Drains:  Invasive Devices     Peripheral Intravenous Line  Duration           Peripheral IV 09/26/23 Left Forearm <1 day                  Telemetry:  Telemetry Orders (From admission, onward)             24 Hour Telemetry Monitoring  Continuous x 24 Hours (Telem)        Question:  Reason for 24 Hour Telemetry  Answer:  Arrhythmias requiring acute medical intervention / PPM or ICD malfunction                 Telemetry Reviewed: Normal Sinus Rhythm  Indication for Continued Telemetry Use: PE             Imaging: No pertinent imaging reviewed. Recent Cultures (last 7 days):   Results from last 7 days   Lab Units 09/25/23  2301 09/25/23  1447 09/25/23  1359   BLOOD CULTURE   --  No Growth at 24 hrs. No Growth at 24 hrs.    LEGIONELLA URINARY ANTIGEN  Negative  --   --        Last 24 Hours Medication List:   Current Facility-Administered Medications   Medication Dose Route Frequency Provider Last Rate   • acetaminophen  650 mg Oral Q6H PRN Deb Dejesus PA-C     • albuterol  2 puff Inhalation Q6H PRN Deb Dejesus PA-C     • aluminum-magnesium hydroxide-simethicone  30 mL Oral Q6H PRN Deb Dejesus PA-C     • amphetamine-dextroamphetamine  10 mg Oral BID before breakfast/lunch Deb Dejesus PA-C     • budesonide-formoterol  2 puff Inhalation BID Deb Dejesus PA-C     • cyclobenzaprine  5 mg Oral TID Leeanne Nageotte, PA-C     • dextromethorphan-guaiFENesin  10 mL Oral Q4H PRN Deb Dejesus PA-C     • enoxaparin  40 mg Subcutaneous Q12H [address] Deb Dejesus PA-C     • gabapentin  300 mg Oral TID Deb Dejesus PA-C     • hydroxychloroquine  200 mg Oral BID Deb Dejesus PA-C     • melatonin  6 mg Oral HS PRN Pennie Millet NATALIA Dejesus     • methylPREDNISolone sodium succinate  40 mg Intravenous Daily Deb Dejesus PA-C     • montelukast  10 mg Oral HS Deb Dejesus PA-C     • nystatin  500,000 Units Swish & Swallow 4x Daily Usman Castanon PA-C     • nystatin   Topical BID Dayne Busch PA-C     • ondansetron  4 mg Intravenous Q6H PRN Deb Dejesus PA-C     • oxyCODONE  5 mg Oral Q4H PRN Porfirio Still PA-C     • pantoprazole  40 mg Oral Early Morning Antonieta Art PA-C          Today, Patient Was Seen By: Иван Santamaria MD    **Please Note: This note may have been constructed using a voice recognition system. **

## 2023-09-27 NOTE — CONSULTS
Consultation - Acute Pain Service  Patience Parikh 43 y.o. female MRN: [id number]  Unit/Bed#: S -01 Encounter: [id number]               Patience Parikh is a 43 y.o. female with left shoulder/neck pain following left first rib excision. Postoperative pain  Assessment & Plan  · Tylenol 650 mg p.o. every 6 hours as needed mild pain. · Change Flexeril to 5 mg p.o. 3 times daily scheduled. · Gabapentin 300 mg p.o. 3 times daily scheduled. · Change oxycodone to 5 mg p.o. every 4 hours as needed moderate to severe pain. · Bowel regimen while on opioid pain medication to avoid opioid-induced constipation. · Ice to painful area for up to 20 minutes every hour as needed. · Encourage continuation of left arm movement to avoid stiffness and weakness which can lead to worsening pain. GABBY (obstructive sleep apnea)  Assessment & Plan  • Patient's with sleep apnea are at higher risk of respiratory depression secondary to opioid use. • Attempt to minimize use of opioid pain medication while maintaining adequate analgesia. Tobacco abuse  Assessment & Plan  • Smokers have been shown to require higher doses of opioid pain medication and are more likely to develop chronic pain. • Encourage smoking cessation. APS will continue to follow. Please contact Acute Pain Service - via BevyUp from [phone] with additional questions or concerns. See BevyUp or Kayla for additional contacts and after hours information. History of Present Illness    Admit Date:  9/25/2023  Hospital Day:  2 days  Primary Service:  Hospitalist  Attending Provider:  Neeta Freeman MD  Physician Requesting Consult: Neeta Freeman MD  Reason for Consult / Principal Problem: Left shoulder and neck pain.   HPI: Patience Parikh is a 43y.o. year old female who presents with pain at the base of the left neck in the area of an incision for first rib resection which occurred on 9/19/2023 at an outside hospital.  Patient seen in the emergency department here and admitted following shortness of breath and worsening cough as well as concern for tongue swelling. Once admitted, patient noted that she continued to have severe pain in her left neck in the area where she had a cervical rib resection on 9/19/2023 at [facility].  On further questioning, patient states that her pain was uncontrolled initially prior to discharge from [facility] but was placed on higher dose of oxycodone with improvement in pain. After discharge, she noted that the oxycodone was not working as well and then noted that the dose was less than she was taking at the hospital.  Patient placed back on oxycodone here and states that her pain has improved while on it, however does not last 8 hours between doses as it is ordered every 8 hours as needed. Patient continues to move her left arm frequently as she was instructed following discharge from the hospital in order to avoid stiffness and debility in the left upper extremity. Patient with multiple other complaints being managed by primary service. Of note, there is documentation the patient had an anaphylactic reaction to Toradol while at [facility].  Patient does not clear that this is the case but does state that she developed shortness of breath and wheezing approximately 10 minutes after receiving Toradol but states that those symptoms have not yet gone away. Current pain location(s): Pain Score: 8  Pain Location/Orientation: Location: Neck, Location: Head, Location: Back, Location: Chest  Pain Scale: Pain Assessment Tool: 0-10  Current Analgesic regimen:  Tylenol 650 mg p.o. every 6 hours as needed mild pain. Oxycodone 5 mg p.o. every 8 hours as needed severe pain. Flexeril 5 mg p.o. 3 times daily as needed muscle spasm. Gabapentin 300 mg p.o. 3 times daily scheduled.     Pain History: None  Pain Management Physician: None    I have reviewed the patient's controlled substance dispensing history in the Prescription Drug Monitoring Program in compliance with the [facility] regulations before prescribing any controlled substances. Consults    Review of Systems   Respiratory: Positive for cough, shortness of breath and wheezing. Cardiovascular: Positive for chest pain. Musculoskeletal: Positive for neck pain. All other systems reviewed and are negative.       Historical Information   Past Medical History:   Diagnosis Date   • Allergic rhinitis    • Asthma    • Psychiatric disorder      Past Surgical History:   Procedure Laterality Date   • LAPAROSCOPIC ENDOMETRIOSIS FULGURATION     • TONSILLECTOMY     • WRIST SURGERY Right     wrist shattered / bone graft     Social History   Social History     Substance and Sexual Activity   Alcohol Use No     Social History     Substance and Sexual Activity   Drug Use No     Social History     Tobacco Use   Smoking Status Every Day   • Packs/day: 0.25   • Years: 8.00   • Total pack years: 2.00   • Types: Cigarettes   Smokeless Tobacco Never   Tobacco Comments    patient on chantix to quit     Family History:   Family History   Problem Relation Age of Onset   • Diabetes Mother    • Hypertension Mother    • Hyperlipidemia Father    • Hypertension Father    • Arthritis Paternal Grandfather    • Osteoporosis Paternal Grandfather        Meds/Allergies   all current active meds have been reviewed, current meds:   Current Facility-Administered Medications   Medication Dose Route Frequency   • acetaminophen (TYLENOL) tablet 650 mg  650 mg Oral Q6H PRN   • albuterol (PROVENTIL HFA,VENTOLIN HFA) inhaler 2 puff  2 puff Inhalation Q6H PRN   • aluminum-magnesium hydroxide-simethicone (MAALOX) oral suspension 30 mL  30 mL Oral Q6H PRN   • amphetamine-dextroamphetamine (ADDERALL) tablet 10 mg  10 mg Oral BID before breakfast/lunch   • budesonide-formoterol (SYMBICORT) 160-4.5 mcg/act inhaler 2 puff  2 puff Inhalation BID   • cyclobenzaprine (FLEXERIL) tablet 5 mg  5 mg Oral TID   • dextromethorphan-guaiFENesin (ROBITUSSIN DM) oral syrup 10 mL  10 mL Oral Q4H PRN   • enoxaparin (LOVENOX) subcutaneous injection 40 mg  40 mg Subcutaneous Q12H [address]   • gabapentin (NEURONTIN) capsule 300 mg  300 mg Oral TID   • hydroxychloroquine (PLAQUENIL) tablet 200 mg  200 mg Oral BID   • melatonin tablet 6 mg  6 mg Oral HS PRN   • methylPREDNISolone sodium succinate (Solu-MEDROL) injection 40 mg  40 mg Intravenous Daily   • montelukast (SINGULAIR) tablet 10 mg  10 mg Oral HS   • nystatin (MYCOSTATIN) oral suspension 500,000 Units  500,000 Units Swish & Swallow 4x Daily   • nystatin (MYCOSTATIN) powder   Topical BID   • ondansetron (ZOFRAN) injection 4 mg  4 mg Intravenous Q6H PRN   • oxyCODONE (ROXICODONE) IR tablet 5 mg  5 mg Oral Q4H PRN   • pantoprazole (PROTONIX) EC tablet 40 mg  40 mg Oral Early Morning    and PTA meds:   Prior to Admission Medications   Prescriptions Last Dose Informant Patient Reported? Taking? Symbicort 160-4.5 MCG/ACT inhaler 9/25/2023 Self Yes Yes   Sig: TAKE 2 PUFFS BY MOUTH TWICE A DAY IN THE MORNING AND IN THE EVENING   albuterol (PROVENTIL HFA,VENTOLIN HFA) 90 mcg/act inhaler 9/25/2023 Self Yes Yes   Sig: Inhale 2 puffs every 6 (six) hours as needed for wheezing   amphetamine-dextroamphetamine (ADDERALL XR) 20 MG 24 hr capsule Past Week Self Yes Yes   Sig: Take 20 mg by mouth every morning   hydroxychloroquine (PLAQUENIL) 200 mg tablet 9/24/2023 Self Yes Yes   Sig: TAKE 1 TABLET (200 MG TOTAL) BY MOUTH 2 TIMES A DAY.    naproxen (NAPROSYN) 500 mg tablet  Self No No   Sig: Take 1 tablet (500 mg total) by mouth 2 (two) times a day with meals for 10 days      Facility-Administered Medications: None       Allergies   Allergen Reactions   • Ketorolac Angioedema     Pt with respiratory distress and angioedema of tongue following administration of ketorolac in PACU   • Latex Other (See Comments) and Shortness Of Breath     Patient states it has been a long time but she vaguely remembers having difficulty breathing and swelling up   • Effexor [Venlafaxine]    • Viibryd [Vilazodone Hcl] Other (See Comments)     "passed out and was unresponsive"   • Chlorhexidine Hives and Rash     Hives following chlorhexidine prep with nerve block. Hives following chlorhexidine prep with nerve block. Objective   Vitals:    09/27/23 0300 09/27/23 0600 09/27/23 0748 09/27/23 0756   BP:   (!) 71/53 144/80   BP Location:   Right arm Right arm   Pulse: 104  85    Resp: 18  18    Temp:   97.9 °F (36.6 °C)    TempSrc:   Oral    SpO2: 97%  95%    Weight:  105 kg (231 lb 0.7 oz)     Height:           No intake or output data in the 24 hours ending 09/27/23 1351    Physical Exam  Vitals and nursing note reviewed. Constitutional:       General: She is awake. Appearance: She is obese. She is not ill-appearing, toxic-appearing or diaphoretic. Comments: Appears mildly uncomfortable. Neck:     Skin:     General: Skin is warm and dry. Neurological:      Mental Status: She is alert and oriented to person, place, and time. GCS: GCS eye subscore is 4. GCS verbal subscore is 5. GCS motor subscore is 6. Psychiatric:         Attention and Perception: Attention normal.         Speech: Speech normal.         Behavior: Behavior normal. Behavior is cooperative. Lab Results:  Estimated Creatinine Clearance: 118.3 mL/min (by C-G formula based on SCr of 0.74 mg/dL).   Lab Results   Component Value Date    WBC 17.29 (H) 09/27/2023    WBC 12.96 (H) 11/14/2014    HGB 12.3 09/27/2023    HGB 13.2 11/14/2014    HCT 39.9 09/27/2023    HCT 41.2 11/14/2014     (H) 09/27/2023     11/14/2014         Component Value Date/Time     11/14/2014 1317    K 4.3 09/27/2023 0441    K 3.6 11/14/2014 1317     09/27/2023 0441     11/14/2014 1317    CO2 24 09/27/2023 0441    CO2 28 11/14/2014 1317    BUN 11 09/27/2023 0441    BUN 10 11/14/2014 1317 CREATININE 0.74 09/27/2023 0441    CREATININE 0.54 (L) 11/14/2014 1317         Component Value Date/Time    CALCIUM 9.5 09/27/2023 0441    CALCIUM 9.2 11/14/2014 1317    ALKPHOS 65 09/27/2023 0441    AST 25 09/27/2023 0441    ALT 42 09/27/2023 0441    TP 7.2 09/27/2023 0441    ALB 4.1 09/27/2023 0441       Imaging Studies/EKG: I have personally reviewed pertinent reports. Counseling / Coordination of Care  Total floor / unit time spent today 43 minutes. Greater than 50% of total time was spent with the patient and / or family counseling and / or coordination of care. A description of the counseling / coordination of care: Patient interview, physical examination, review of medical records, review of imaging and laboratory data, development of pain management plan, discussion of pain management plan with patient and primary service. Please note that the APS provides consultative services regarding pain management only. With the exception of ketamine and epidural infusions and except when indicated, final decisions regarding starting or changing doses of analgesic medications are at the discretion of the consulting service.   Anisha Carranza PA-C  Acute Pain Service

## 2023-09-27 NOTE — QUICK NOTE
TT from RN: Patient worried that tongue swelling and rash near her incision is worsening despite using IV benadryl and claritin. Patient reports tongue swelling ("feels like I have to work to swallow anything"), itchy rash on her left neck, breast and arm and chest tightness with dyspnea which has been coming and going. Mostly improving with analgesia and IV benadryl however tonight she got IV benadryl and it is not improving as before. I Think her tongue is large but not abnormally swollen and her airway is patent. Her tongue and neck are symmetrical on inspection. Her Annette Blue is 4 which is a known problem for her. I don't see any rashes or edema in her oral mucosa. No thrush. I watch her swallow water without difficulty and no choking which is good. Her rash on the left incision site is worse than yesterday when I admitted her and the left breast and arm are about the same as yesterday I would say on my physical exam.    She is not wheezing and her work of breathing is acceptable when she is speaking to me and while walking around the room. Her procal is negative x 2 so I will dc her IV abx in case that is contributing in any way to the reaction. She does have a history of unknown autoimmune disease and multiple allergies. I reviewed the list of medications that she received at [facility] ilene-op, intra-op and on discharge. According to the OP note there is no implanted hardware or impregnated substances in the incision site to which she could be reacting. There is no comment on what solution was used for cleaning the skin however her chlorhex allergy was documented several times. They used "antibiotic irrigation" which I guess is the Ancef which was listed on her DC paperwork from [facility] which she has at bedside. At this point she is POD 7. I want to decrease the claritin and Benadryl to avoid antihistamine paradoxical excitation. Can continue singulair for now.  She has to be careful about the ice pack/ creams/ linens that she uses on her skin to avoid contact dermatitis. She can wear clothes from home, I dc'd the creams which were Rx on dc from [facility] and she is already using her own pillows/ pillow cases at the hospital. Also, she can bring ice packs from home. She is already using her own CPAP macine from home. She is using the incentive spirometer. I will ask the respiratory therapist to make sure there is no latex in that device. If so, she can work on deep breathing with videos online or by walking around the hospital hallways. Will give burst of IV solumedrol and monitor incision site and airway.

## 2023-09-27 NOTE — ASSESSMENT & PLAN NOTE
· Fluid overload likely from iatrogenic IVFs during recent hospitalization/ surgery  · BNP wnl doubt CHF given lack of risk factors  · Resolved with 2 doses IV lasix  · Weight is back to baseline

## 2023-09-27 NOTE — ASSESSMENT & PLAN NOTE
• Smokers have been shown to require higher doses of opioid pain medication and are more likely to develop chronic pain. • Encourage smoking cessation.

## 2023-09-27 NOTE — ASSESSMENT & PLAN NOTE
· GGO mostly the right lung on CTA PE, could represent atypical PNA vs pulm edema from fluid overload  · Procal is negative x 2.  Dc abx and monitor sxs/ CBC  · Pulm toilet  · Symptomatic treatment  · Covid/flu RSV negative  · Fu on CT chest as OP eventually to monitor for resolution  · Procal x 2 is negative   · Will DC abx  · But given hypoxia and ongoing symptoms will consult pulmonology   · Come down on steroids

## 2023-09-28 ENCOUNTER — APPOINTMENT (INPATIENT)
Dept: NON INVASIVE DIAGNOSTICS | Facility: HOSPITAL | Age: 38
DRG: 205 | End: 2023-09-28
Payer: COMMERCIAL

## 2023-09-28 PROBLEM — R06.02 SOB (SHORTNESS OF BREATH): Status: ACTIVE | Noted: 2023-09-25

## 2023-09-28 LAB
ALBUMIN SERPL BCP-MCNC: 4 G/DL (ref 3.5–5)
ALP SERPL-CCNC: 58 U/L (ref 34–104)
ALT SERPL W P-5'-P-CCNC: 36 U/L (ref 7–52)
ANA SER QL IA: NEGATIVE
ANION GAP SERPL CALCULATED.3IONS-SCNC: 9 MMOL/L
AORTIC ROOT: 2.5 CM
ASCENDING AORTA: 2.7 CM
AST SERPL W P-5'-P-CCNC: 20 U/L (ref 13–39)
BASOPHILS # BLD AUTO: 0.17 THOUSANDS/ÂΜL (ref 0–0.1)
BASOPHILS NFR BLD AUTO: 1 % (ref 0–1)
BILIRUB SERPL-MCNC: 0.29 MG/DL (ref 0.2–1)
BUN SERPL-MCNC: 14 MG/DL (ref 5–25)
C4 SERPL-MCNC: 62 MG/DL (ref 19–52)
CALCIUM SERPL-MCNC: 9.4 MG/DL (ref 8.4–10.2)
CHLORIDE SERPL-SCNC: 103 MMOL/L (ref 96–108)
CO2 SERPL-SCNC: 24 MMOL/L (ref 21–32)
CREAT SERPL-MCNC: 0.68 MG/DL (ref 0.6–1.3)
EOSINOPHIL # BLD AUTO: 0.01 THOUSAND/ÂΜL (ref 0–0.61)
EOSINOPHIL NFR BLD AUTO: 0 % (ref 0–6)
ERYTHROCYTE [DISTWIDTH] IN BLOOD BY AUTOMATED COUNT: 14.3 % (ref 11.6–15.1)
FRACTIONAL SHORTENING: 44 (ref 28–44)
GFR SERPL CREATININE-BSD FRML MDRD: 110 ML/MIN/1.73SQ M
GLUCOSE SERPL-MCNC: 127 MG/DL (ref 65–140)
HCT VFR BLD AUTO: 39.6 % (ref 34.8–46.1)
HGB BLD-MCNC: 12.3 G/DL (ref 11.5–15.4)
IMM GRANULOCYTES # BLD AUTO: >0.5 THOUSAND/UL (ref 0–0.2)
IMM GRANULOCYTES NFR BLD AUTO: 2 % (ref 0–2)
INTERVENTRICULAR SEPTUM IN DIASTOLE (PARASTERNAL SHORT AXIS VIEW): 1 CM
INTERVENTRICULAR SEPTUM: 1 CM (ref 0.6–1.1)
LEFT INTERNAL DIMENSION IN SYSTOLE: 2.3 CM (ref 2.1–4)
LEFT VENTRICULAR INTERNAL DIMENSION IN DIASTOLE: 4.1 CM (ref 3.5–6)
LEFT VENTRICULAR POSTERIOR WALL IN END DIASTOLE: 1.1 CM
LEFT VENTRICULAR STROKE VOLUME: 59 ML
LVSV (TEICH): 59 ML
LYMPHOCYTES # BLD AUTO: 2.68 THOUSANDS/ÂΜL (ref 0.6–4.47)
LYMPHOCYTES NFR BLD AUTO: 7 % (ref 14–44)
MCH RBC QN AUTO: 27.3 PG (ref 26.8–34.3)
MCHC RBC AUTO-ENTMCNC: 31.1 G/DL (ref 31.4–37.4)
MCV RBC AUTO: 88 FL (ref 82–98)
MONOCYTES # BLD AUTO: 2.86 THOUSAND/ÂΜL (ref 0.17–1.22)
MONOCYTES NFR BLD AUTO: 8 % (ref 4–12)
NEUTROPHILS # BLD AUTO: 29.96 THOUSANDS/ÂΜL (ref 1.85–7.62)
NEUTS SEG NFR BLD AUTO: 82 % (ref 43–75)
NRBC BLD AUTO-RTO: 0 /100 WBCS
PLATELET # BLD AUTO: 463 THOUSANDS/UL (ref 149–390)
PMV BLD AUTO: 9.2 FL (ref 8.9–12.7)
POTASSIUM SERPL-SCNC: 4.1 MMOL/L (ref 3.5–5.3)
PROT SERPL-MCNC: 7.1 G/DL (ref 6.4–8.4)
RBC # BLD AUTO: 4.5 MILLION/UL (ref 3.81–5.12)
SL CV LV EF: 65
SL CV PED ECHO LEFT VENTRICLE DIASTOLIC VOLUME (MOD BIPLANE) 2D: 76 ML
SL CV PED ECHO LEFT VENTRICLE SYSTOLIC VOLUME (MOD BIPLANE) 2D: 17 ML
SODIUM SERPL-SCNC: 136 MMOL/L (ref 135–147)
TRICUSPID ANNULAR PLANE SYSTOLIC EXCURSION: 2.8 CM
WBC # BLD AUTO: 36.56 THOUSAND/UL (ref 4.31–10.16)

## 2023-09-28 PROCEDURE — 99232 SBSQ HOSP IP/OBS MODERATE 35: CPT

## 2023-09-28 PROCEDURE — 93306 TTE W/DOPPLER COMPLETE: CPT | Performed by: INTERNAL MEDICINE

## 2023-09-28 PROCEDURE — 99232 SBSQ HOSP IP/OBS MODERATE 35: CPT | Performed by: PHYSICIAN ASSISTANT

## 2023-09-28 PROCEDURE — 93306 TTE W/DOPPLER COMPLETE: CPT

## 2023-09-28 PROCEDURE — 86160 COMPLEMENT ANTIGEN: CPT

## 2023-09-28 PROCEDURE — 99223 1ST HOSP IP/OBS HIGH 75: CPT | Performed by: INTERNAL MEDICINE

## 2023-09-28 PROCEDURE — 85025 COMPLETE CBC W/AUTO DIFF WBC: CPT | Performed by: INTERNAL MEDICINE

## 2023-09-28 PROCEDURE — 94660 CPAP INITIATION&MGMT: CPT

## 2023-09-28 PROCEDURE — 86225 DNA ANTIBODY NATIVE: CPT

## 2023-09-28 PROCEDURE — NC001 PR NO CHARGE: Performed by: DERMATOLOGY

## 2023-09-28 PROCEDURE — 80053 COMPREHEN METABOLIC PANEL: CPT | Performed by: INTERNAL MEDICINE

## 2023-09-28 PROCEDURE — 99222 1ST HOSP IP/OBS MODERATE 55: CPT | Performed by: PHYSICIAN ASSISTANT

## 2023-09-28 PROCEDURE — 92610 EVALUATE SWALLOWING FUNCTION: CPT

## 2023-09-28 PROCEDURE — 86235 NUCLEAR ANTIGEN ANTIBODY: CPT

## 2023-09-28 PROCEDURE — 86037 ANCA TITER EACH ANTIBODY: CPT

## 2023-09-28 PROCEDURE — 86038 ANTINUCLEAR ANTIBODIES: CPT

## 2023-09-28 PROCEDURE — 83520 IMMUNOASSAY QUANT NOS NONAB: CPT

## 2023-09-28 PROCEDURE — 82785 ASSAY OF IGE: CPT

## 2023-09-28 RX ORDER — METHYLPREDNISOLONE SODIUM SUCCINATE 40 MG/ML
40 INJECTION, POWDER, LYOPHILIZED, FOR SOLUTION INTRAMUSCULAR; INTRAVENOUS EVERY 12 HOURS SCHEDULED
Status: DISCONTINUED | OUTPATIENT
Start: 2023-09-28 | End: 2023-09-29 | Stop reason: HOSPADM

## 2023-09-28 RX ORDER — ECHINACEA PURPUREA EXTRACT 125 MG
1 TABLET ORAL
Status: DISCONTINUED | OUTPATIENT
Start: 2023-09-28 | End: 2023-09-29 | Stop reason: HOSPADM

## 2023-09-28 RX ADMIN — NYSTATIN 500000 UNITS: 100000 SUSPENSION ORAL at 12:11

## 2023-09-28 RX ADMIN — GABAPENTIN 300 MG: 300 CAPSULE ORAL at 21:14

## 2023-09-28 RX ADMIN — NYSTATIN 500000 UNITS: 100000 SUSPENSION ORAL at 09:00

## 2023-09-28 RX ADMIN — OXYCODONE HYDROCHLORIDE 5 MG: 5 TABLET ORAL at 21:14

## 2023-09-28 RX ADMIN — ENOXAPARIN SODIUM 40 MG: 40 INJECTION SUBCUTANEOUS at 21:14

## 2023-09-28 RX ADMIN — CYCLOBENZAPRINE 5 MG: 10 TABLET, FILM COATED ORAL at 09:01

## 2023-09-28 RX ADMIN — BUDESONIDE AND FORMOTEROL FUMARATE DIHYDRATE 2 PUFF: 160; 4.5 AEROSOL RESPIRATORY (INHALATION) at 17:46

## 2023-09-28 RX ADMIN — CYCLOBENZAPRINE 5 MG: 10 TABLET, FILM COATED ORAL at 21:14

## 2023-09-28 RX ADMIN — NYSTATIN 500000 UNITS: 100000 SUSPENSION ORAL at 17:44

## 2023-09-28 RX ADMIN — METHYLPREDNISOLONE SODIUM SUCCINATE 40 MG: 40 INJECTION, POWDER, FOR SOLUTION INTRAMUSCULAR; INTRAVENOUS at 09:01

## 2023-09-28 RX ADMIN — PANTOPRAZOLE SODIUM 40 MG: 40 TABLET, DELAYED RELEASE ORAL at 05:00

## 2023-09-28 RX ADMIN — ENOXAPARIN SODIUM 40 MG: 40 INJECTION SUBCUTANEOUS at 09:00

## 2023-09-28 RX ADMIN — NYSTATIN: 100000 POWDER TOPICAL at 09:02

## 2023-09-28 RX ADMIN — HYDROXYCHLOROQUINE SULFATE 200 MG: 200 TABLET, FILM COATED ORAL at 17:46

## 2023-09-28 RX ADMIN — GUAIFENESIN AND DEXTROMETHORPHAN 10 ML: 100; 10 SYRUP ORAL at 21:53

## 2023-09-28 RX ADMIN — NYSTATIN: 100000 POWDER TOPICAL at 17:45

## 2023-09-28 RX ADMIN — GABAPENTIN 300 MG: 300 CAPSULE ORAL at 17:45

## 2023-09-28 RX ADMIN — HYDROXYCHLOROQUINE SULFATE 200 MG: 200 TABLET, FILM COATED ORAL at 09:02

## 2023-09-28 RX ADMIN — GABAPENTIN 300 MG: 300 CAPSULE ORAL at 09:01

## 2023-09-28 RX ADMIN — Medication 6 MG: at 21:14

## 2023-09-28 RX ADMIN — OXYCODONE HYDROCHLORIDE 5 MG: 5 TABLET ORAL at 09:00

## 2023-09-28 RX ADMIN — ONDANSETRON 4 MG: 2 INJECTION INTRAMUSCULAR; INTRAVENOUS at 22:03

## 2023-09-28 RX ADMIN — CYCLOBENZAPRINE 5 MG: 10 TABLET, FILM COATED ORAL at 17:45

## 2023-09-28 RX ADMIN — METHYLPREDNISOLONE SODIUM SUCCINATE 40 MG: 40 INJECTION, POWDER, FOR SOLUTION INTRAMUSCULAR; INTRAVENOUS at 21:14

## 2023-09-28 RX ADMIN — OXYCODONE HYDROCHLORIDE 5 MG: 5 TABLET ORAL at 05:00

## 2023-09-28 RX ADMIN — NYSTATIN 500000 UNITS: 100000 SUSPENSION ORAL at 21:14

## 2023-09-28 RX ADMIN — OXYCODONE HYDROCHLORIDE 5 MG: 5 TABLET ORAL at 14:28

## 2023-09-28 RX ADMIN — MONTELUKAST 10 MG: 10 TABLET, FILM COATED ORAL at 21:14

## 2023-09-28 RX ADMIN — BUDESONIDE AND FORMOTEROL FUMARATE DIHYDRATE 2 PUFF: 160; 4.5 AEROSOL RESPIRATORY (INHALATION) at 09:02

## 2023-09-28 NOTE — DISCHARGE INSTR - DIET
Regular diet w/ thin liquids   Meds as tolerated Subsequent Stages Histo Method Verbiage: Using a similar technique to that described above, a thin layer of tissue was removed from all areas where tumor was visible on the previous stage.  The tissue was again oriented, mapped, dyed, and processed as above.

## 2023-09-28 NOTE — UTILIZATION REVIEW
Continued Stay Review    Date: 9/28         Current Patient Class: IP  Current Level of Care: MS    HPI:38 y.o. female initially admitted on 9/25 with SOB, thoracic outlet syndrome, s/p L 1st cervical rib removal on 9/19 @ [facility]. Assessment/Plan:   Date: 9/28  Day 4:   WBC up to 36 today, tachycardia, afebrile. Remains on IV steroids. Using PO analgesia. No changes to pain regimen today, Pain team signing off. D/c IV antibiotics today. Rash improving. Derm consult done - Angioedema, improving. Still with some SOB.        9/28S bedside swallow eval  today. Normal appearing oral and pharyngeal swallowing skills. Pt stated she feels her throat swelling and has difficulty breathing and foods getting stuck- it does not happen all the time. Pt also has hx of GERD, but does not take omeprazole consistently. Pt stated her swallowing feels much improved. regular diet, thin liquids, med whole w/ liquids, 2 x daily oral care. 9/27  Acute pain Consult  - pt with left shoulder/neck pain following left first rib excision - PO Pain- Scheduled Flexeril 5 mg TID,  Gabapentin 300 mg TID. PRNs Tylenol 650 mg PRN, Oxycodone 5 mg PO Q 4 hr PRN mod to severe pain. Ice, encourage movement L arem to avoid stiffness, weakness, bowel regimen. GABBY - Attempt to minimize use of opioid pain medication while maintaining adequate analgesia. Tobacco use - cessation. 9/28 Vascular Surgery Consult - h/o TOS and POD # 9 s/p 1st cervical rib removal  - developed analphalaxis to Toradol with rash, diff breathing which resolved. Since d/c has increasing SOB r/t asthma. 9/25 admission to [facility] initial symptoms improved. Still c/o SOB and rash. On exam LUE with intact ROM. Tenderness to palpation left forearm with light touch. NVI. No significant edema noted. 1+distal radius and ulna pulse.   + rash.       9/28 Pulmonary Consult -  subjective globus sensation in the setting of reflux which was exacerbated by recent extubation, with erythematous rash of the chest face, with increased swelling of bilateral lower extremity starting Sunday (9/14) with increase of 14 lbs since surgery. Thought to be due to angioedema. C4, C1 esterase inhibitor level pending, Derm on board, WBC elevated but is on steroids, low suspicion active infection. Continue Benadryl, epi with throat culture, and follow C4, C1 esterast inhibitors. 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 Resolved with administration of epinephrine. Continued on antihistamines and prednisone without recurrence of symptoms. Date: 9/27 -  Imaging improvement in area of GGO in R lung. Trace L pleural effusion improvement, LLL consolidation slightly worsening, increased in size, no PE, mild cardiomegaly.      Vital Signs:   09/28/23 0700 98.8 °F (37.1 °C) 106 Abnormal  18 156/87 110 96 % -- -- -- -- --   09/28/23 0127 -- -- -- -- -- -- -- -- -- Full face mask --   09/27/23 20:24:21 99 °F (37.2 °C) 123 Abnormal  18 182/91 Abnormal  121 92 % 28 2 L/min Nasal cannula -- Sitting   09/27/23 14:53:43 99 °F (37.2 °C) 113 Abnormal  17 136/84 101 94 % 28 2 L/min Nasal cannula -- Sitting   09/27/23 0756 -- -- -- 144/80 -- -- -- -- -- -- --   09/27/23 07:48:37 97.9 °F (36.6 °C) 85 18 71/53 Abnormal  59 Abnormal  95 % 36 4 L/min Nasal cannula -- Lying   09/27/23 0300 -- 104 18 -- -- 97 % -- -- -- -- --   09/26/23 2039 99.3 °F (37.4 °C) 105 18 120/68 86 97 % -- -- None (Room air) -- Sitting   09/26/23 15:15:40 98.6 °F (37 °C) 101 18 144/73 97 -- -- -- -- -- --   09/26/23 13:12:29 -- 106 Abnormal  -- 140/81 101 93 % -- -- None (Room air) -- Sitting   09/26/23 0832 -- 100 18 -- -- 94 % -- -- -- -- --   09/26/23 0700 98.4 °F (36.9 °C) 103 18 151/84 -- 94 % -- -- None (Room air) -- Sitting   09/26/23 02:41:19 -- -- -- 126/74 91 -- -- -- -- -- --     Pertinent Labs/Diagnostic Results:    9/27 CTA Chest PE study - 1. Improved areas of groundglass opacity in the right upper lobe and right lower lobe and right middle lobe. 2. Trace left pleural effusion is improved. 3. Left lower lobe lung consolidation/atelectasis is slightly worsened/increased in size compared to the prior exam.  4. No evidence of pulmonary embolism. 5. Mild cardiomegaly.       Results from last 7 days   Lab Units 09/28/23 0457 09/27/23 0441 09/26/23  0556 09/25/23  1251   WBC Thousand/uL 36.56* 17.29* 16.63* 18.66*   HEMOGLOBIN g/dL 12.3 12.3 11.5 11.5   HEMATOCRIT % 39.6 39.9 36.5 36.1   PLATELETS Thousands/uL 463* 411* 375 311   NEUTROS ABS Thousands/µL 29.96*  --  9.69* 12.26*   BANDS PCT %  --  1  --   --          Results from last 7 days   Lab Units 09/28/23 0457 09/27/23 0441 09/26/23  0556 09/25/23  1251   SODIUM mmol/L 136 136 136 138   POTASSIUM mmol/L 4.1 4.3 3.7 3.7   CHLORIDE mmol/L 103 104 101 104   CO2 mmol/L 24 24 26 25   ANION GAP mmol/L 9 8 9 9   BUN mg/dL 14 11 10 8   CREATININE mg/dL 0.68 0.74 0.75 0.68   EGFR ml/min/1.73sq m 110 102 100 110   CALCIUM mg/dL 9.4 9.5 9.0 9.1     Results from last 7 days   Lab Units 09/28/23 0457 09/27/23  0441 09/26/23  0556   AST U/L 20 25 26   ALT U/L 36 42 41   ALK PHOS U/L 58 65 61   TOTAL PROTEIN g/dL 7.1 7.2 6.6   ALBUMIN g/dL 4.0 4.1 3.9   TOTAL BILIRUBIN mg/dL 0.29 0.31 0.36     Results from last 7 days   Lab Units 09/27/23  0439   POC GLUCOSE mg/dl 163*     Results from last 7 days   Lab Units 09/28/23 0457 09/27/23  0441 09/26/23  0556 09/25/23  1251   GLUCOSE RANDOM mg/dL 127 174* 89 91     Results from last 7 days   Lab Units 09/27/23  1348 09/27/23  1203 09/25/23  1700 09/25/23  1522 09/25/23  1251   HS TNI 0HR ng/L  --  2  --   --  4   HS TNI 2HR ng/L 3  --   --  4  --    HSTNI D2 ng/L 1  --   --  0  --    HS TNI 4HR ng/L  --   --  4  --   --    HSTNI D4 ng/L  --   --  0  --   --      Results from last 7 days   Lab Units 09/27/23  1203   D-DIMER QUANTITATIVE ug/ml FEU 1.57*     Results from last 7 days   Lab Units 09/25/23  1251   PROTIME seconds 12.7   INR  0.90   PTT seconds 23         Results from last 7 days   Lab Units 09/26/23  0556 09/25/23  1251   PROCALCITONIN ng/ml 0.06 0.07     Results from last 7 days   Lab Units 09/25/23  1447   LACTIC ACID mmol/L 1.2             Results from last 7 days   Lab Units 09/25/23  1251   BNP pg/mL 26     Results from last 7 days   Lab Units 09/25/23  1337   CLARITY UA  Clear   COLOR UA  Light Yellow   SPEC GRAV UA  1.011   PH UA  7.0   GLUCOSE UA mg/dl Negative   KETONES UA mg/dl Negative   BLOOD UA  Negative   PROTEIN UA mg/dl Negative   NITRITE UA  Negative   BILIRUBIN UA  Negative   UROBILINOGEN UA (BE) mg/dl <2.0   LEUKOCYTES UA  Negative     Results from last 7 days   Lab Units 09/25/23  2301   STREP PNEUMONIAE ANTIGEN, URINE  Negative   LEGIONELLA URINARY ANTIGEN  Negative       Results from last 7 days   Lab Units 09/25/23  1447 09/25/23  1359   BLOOD CULTURE  No Growth at 48 hrs. No Growth at 48 hrs.      Medications:   Scheduled Medications:  amphetamine-dextroamphetamine, 10 mg, Oral, BID before breakfast/lunch  budesonide-formoterol, 2 puff, Inhalation, BID  cyclobenzaprine, 5 mg, Oral, TID  enoxaparin, 40 mg, Subcutaneous, Q12H JEEVAN  gabapentin, 300 mg, Oral, TID  hydroxychloroquine, 200 mg, Oral, BID  methylPREDNISolone sodium succinate, 40 mg, Intravenous, Daily  montelukast, 10 mg, Oral, HS  nystatin, 500,000 Units, Swish & Swallow, 4x Daily  nystatin, , Topical, BID  pantoprazole, 40 mg, Oral, Early Morning      Continuous IV Infusions:     PRN Meds:  acetaminophen, 650 mg, Oral, Q6H PRN -x 1 9/27  albuterol, 2 puff, Inhalation, Q6H PRN  aluminum-magnesium hydroxide-simethicone, 30 mL, Oral, Q6H PRN  dextromethorphan-guaiFENesin, 10 mL, Oral, Q4H PRN  melatonin, 6 mg, Oral, HS PRN -x 1 9/27  ondansetron, 4 mg, Intravenous, Q6H PRN  oxyCODONE, 5 mg, Oral, Q4H PRN - x 3 9/27, x 2 9/28    Discharge Plan: TBD    Network Utilization Review Department  ATTENTION: Please call with any questions or concerns to [phone] and carefully listen to the prompts so that you are directed to the right person. All voicemails are confidential.  Sully Pollard all requests for admission clinical reviews, approved or denied determinations and any other requests to dedicated fax number below belonging to the campus where the patient is receiving treatment.  List of dedicated fax numbers for the Facilities:  [place] DENIALS (Administrative/Medical Necessity) [phone] [address] [facility] (Maternity/NICU/Pediatrics) [phone]   [address] [place] [address] [phone]   [facility] [address] [facility] [phone]   [address] [facility] [address] [facility] [address] [phone]   [address] [facility] [id number] [phone]

## 2023-09-28 NOTE — NURSING NOTE
Primary RN was notified by previous RN about patient requesting to transfer to [address] [place] [address]. Patient requesting to speak to nursing supervisor. Nursing supervisor made aware. Primary RN and nursing supervisor spoke to patient and family about concerns. Patient and family were educated about dermatology and vascular surgery being consulted, abx being stopped, and monitoring for sepsis. Nursing supervisor instructed patient and family on process of being transferred. Primary RN made SLIM aware of patient wanting to be transferred.      Rosalva Bender RN

## 2023-09-28 NOTE — ASSESSMENT & PLAN NOTE
· H/o angioedema reaction from Toradol in the PACU after her thoracic outlet surgery vs idiopathic angioedema vs subjective globus sensation (in setting of known reflux and no PPI or control, exacerbated by intubation/surgery)   · Start PPI, IV Benadryl, IV Solu-Medrol twice daily  · Was seen by allergy service at [facility] and according to them nonspecific mast cell degranulation from opioids leading to tongue swelling was also on the differential. Minimize opioid use now. She should not need narcotics on dc from this hospitalization anyway  · Was provided with referral to Dr. Sana Woo who is an allergist/immunologist and is part of [facility].  She has plan to fu with allergy testing with him on Friday 9/29/2023, unfortunately it has been canceled and will be rescheduled in future  · Was dc from [facility] on on cetirizine 10 mg with the option of uptitration to 10 mg bid if that wasn't controlling her sxs however the attending commented that he did not think this medication would make a really great impact on her sxs  · Started IV solumedrol   · Now with rash as well (improving over course of day)  · Dermatology consulted-is not starting autoimmune work-up  · Follow-up with complement labs

## 2023-09-28 NOTE — CONSULTS
Progress Note - Pulmonary   Chapin Middleton 43 y.o. female MRN: [id number]  Unit/Bed#: S -01 Encounter: [id number]    Assessment/Plan:    Dyspnea  Patient has subjective globus sensation in the setting of reflux which was exacerbated by recent extubation, with erythematous rash of the chest face, with increased swelling of bilateral lower extremity starting Sunday (9/14) with increase of 14 lbs since surgery. CT scan shows elevated left hemidiagphragm. Possible phrenic nerve damage from recent surgery. 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 Low suspicion of active infection  Procal was normal, no fevers, chills  Plan:  Patient currently on solumedrol 40 mg once, continue benadryl  Epinephrine with throat closure  Follow up c4, and c1 esterase inhibitor to see if patient has primary vs secondary angioedema  Will order IGE levels  Will add piotr, antihistone, ANCA panel, anti double stranded to rule out drug induced lupus  Will order FL sniff test. If positive may need MRI    Obstructive sleep apnea  Patient was initially diagnosed in 2016 and is compliant with CPAP  Average pressure of 15 cm, AHI 1.0 per pulmonology notes from [facility]  We will continue while admitted    Mild intermittent asthma without complication  Patient seen pulmonology at [address] [place] [address]   Last PFT was in 7/2023  FEV1 FVC was normal with significant bronchodilator response, TLC 79%, DLCO 94%.   Home medications: Albuterol as needed, Symbicort 2 puffs twice daily  Appears well controlled    Seasonal allergies  On Claritin daily  Symbicort BID    Thoracic outlet syndrome  Sees vascular surgery as outpatient  Initially presented to vascular on 12/28/2022 where patient had progressive swelling, numbness and discoloration of the left arm. Paresthesias worsened with abduction. She was referred to neurology for scalene block which was done on 4/6/2023, which relieved her paresthesias. She was referred for rib removal on 5/16/2023. Procedure was aborted due to significant challenging intubation at that time, during which time patient spent time in the ICU due to bronchospasms with copious secretions. 9/19 status post left first cervical rib removal with general anesthesia. Patient spent time postprocedure and surgical ICU. Patient had throat fullness after procedure. Suspicion for anaphylaxis, patient was given Benadryl, hydrocortisone, infusion postextubation      Unspecified autoimmune/connective tissue disease  Patient does have history of arthralgia involving wrists, elbows, ankles, hips, low back, hands, with livedo rash, sicca symptoms and fatigue for several years. Elevated MARTHA,  Has been on Plaquenil since 12/2021 with improvement of joint pain and fatigue. Sees rheumatology as outpatient    Chief Complaint:    SOB rash    Subjective:    Patient is a 69-year-old female with past medical history of asthma, seasonal allergies, depression, endometriosis, obstructive sleep apnea on CPAP since 2016 with average pressure of 15 cm residual AHI was 1.0, obesity, thoracic outlet syndrome status post removal of first cervical rib on 9/19/2023. Rib removal was complicated with anaphylaxis and patient was placed on epinephrine drip, given Benadryl, steroids. Patient was admitted on 9/25 for shortness of breath, progressive swelling of extremities. Patient was noted to have erythematous rash with globus sensation on throat. In our emergency department patient had stable vital signs, temperature was within normal limits, blood pressure was 126/74- 150/87. Patient had elevated WBC to 18.   Troponins have been normal, INR normal, lactic acid normal at 1.2. Procalcitonin negative at 0.06, urine strep pneumonia/Legionella was negative, BNP negative at 26,  X-ray shows bilateral opacities which represent pulmonary edema versus atypical pneumonia, lower leg vascular studies was negative for DVT. CTA PE was done which shows no PE, groundglass opacities in the peripheral of the right lung most pronounced in the right lung apex. Symbicort was continued, patient received 2 doses of Lasix. Has been receiving Solu-Medrol 40 mg daily on  Day 3 , Received 2 doses yesterday,  Patient also received 2 doses of benadyl however has been discontinued    Today patient has been better, afebrile overnight. WBC is elevatged to 36. Patient was treated as volume overload given recent history of surgery,  Repeat CTA PE on 9/27/2023 shows improved groundglass opacities. Echo is pending. Objective:    Vitals: Blood pressure 156/87, pulse (!) 106, temperature 98.8 °F (37.1 °C), resp. rate 18, height 5' 3" (1.6 m), weight 104 kg (228 lb 2.8 oz), SpO2 96 %, not currently breastfeeding. 2 l via nasal cannula,Body mass index is 40.42 kg/m². No intake or output data in the 24 hours ending 09/28/23 0930    Invasive Devices     Peripheral Intravenous Line  Duration           Peripheral IV 09/26/23 Left Forearm 1 day                Physical Exam:     Physical Exam  Constitutional:       Appearance: She is obese. She is not ill-appearing or toxic-appearing. HENT:      Head: Normocephalic and atraumatic. Right Ear: There is no impacted cerumen. Left Ear: There is no impacted cerumen. Mouth/Throat:      Mouth: Mucous membranes are moist.      Pharynx: Oropharynx is clear. Eyes:      Conjunctiva/sclera: Conjunctivae normal.      Pupils: Pupils are equal, round, and reactive to light. Cardiovascular:      Rate and Rhythm: Normal rate and regular rhythm. Pulses: Normal pulses. Heart sounds: Normal heart sounds.  No murmur heard.     No friction rub. Pulmonary:      Effort: No respiratory distress. Breath sounds: Rales present. Abdominal:      General: There is no distension. Palpations: There is no mass. Tenderness: There is no abdominal tenderness. Hernia: No hernia is present. Musculoskeletal:         General: No swelling, tenderness or deformity. Cervical back: Normal range of motion. Right lower leg: Edema (Pitting) present. Left lower leg: Edema present. Skin:     Coloration: Skin is not jaundiced. Findings: No rash. Comments: Rash erythematous throughout body   Neurological:      General: No focal deficit present. Labs: I have personally reviewed pertinent lab results. Imaging and other studies: I have personally reviewed pertinent reports.

## 2023-09-28 NOTE — ASSESSMENT & PLAN NOTE
· Hospitalized at [facility] for scheduled thoracic outlet surgery 9/19/2023 - 9/22/2023  · Cont home pain regimen: oxy, gabapentin, flexeril  · Vascular consulted to rule out possible surgical complication, I have discussed with the vascular team at [facility] on 3 different occasions to discuss case.   Patient remains medically appropriate to continue to treat at [facility]

## 2023-09-28 NOTE — ASSESSMENT & PLAN NOTE
· GGO mostly the right lung on CTA PE, could represent atypical PNA vs pulm edema from fluid overload  · Procal is negative x 2.  Dc abx and monitor sxs/ CBC  · Pulm toilet  · Symptomatic treatment  · Covid/flu RSV negative  · Fu on CT chest as OP eventually to monitor for resolution  · Procal x 2 is negative   · Will DC abx  · But given hypoxia and ongoing symptoms will consult pulmonology-continue to monitor while giving IV Solu-Medrol, start lupus work-up  · Likely not pneumonia, likely more secondary to inflammatory event

## 2023-09-28 NOTE — CONSULTS
Consultation -Vascular Surgery  Rosalinda Phan 43 y.o. female MRN: [id number]  Unit/Bed#: S -01 Encounter: [id number]        ASSESSMENT:   Patient is a 43year old female with PMH of asthma, GABBY, autoimmune/connective disease admitted with SOB. Patient with history of thoracic outlet syndrome and is s/p left 1st cervical rib removal on 9/19/23 at [facility]. Plan:  - will discuss with Dr. Isabel Green      Reason for Consult / Principal Problem:    HPI: Rosalinda Phan is a 43y.o. year old female PMH asthma, GABBY (wears CPAP), unspecified autoimmune/connective tissue disease (on Plaquenil) who presents with SOB. Pt also with hx of TOS s/p 1st cervical rib removal 9/19/23 at [facility]. Patient states that post-operatively she had toradol and developed an anaphylactic reaction including rash, difficulty breathing. Patient's symptoms resolved and she was discharged home on 9/22. Patient states that since discharge she was having increased shortness of breath that she related to her asthma. She had persistent shortness of breath and a rash and was [facility]. Patient states that preop, her TOS symptoms included, left upper extremity pain, vascular congestion, bruising of her left forearm. She states that since the surgery, those symptoms have improved. Review of Systems   Constitutional: Negative for fever. Respiratory: Positive for shortness of breath. Cardiovascular: Negative for chest pain. Gastrointestinal: Negative for abdominal pain. Skin: Positive for rash. All other systems reviewed and are negative.       Historical Information   Past Medical History:   Diagnosis Date   • Allergic rhinitis    • Asthma    • Psychiatric disorder      Past Surgical History:   Procedure Laterality Date   • LAPAROSCOPIC ENDOMETRIOSIS FULGURATION     • TONSILLECTOMY     • WRIST SURGERY Right     wrist shattered / bone graft     Social History   Social History Substance and Sexual Activity   Alcohol Use No     Social History     Substance and Sexual Activity   Drug Use No     Social History     Tobacco Use   Smoking Status Every Day   • Packs/day: 0.25   • Years: 8.00   • Total pack years: 2.00   • Types: Cigarettes   Smokeless Tobacco Never   Tobacco Comments    patient on chantix to quit     Family History   Problem Relation Age of Onset   • Diabetes Mother    • Hypertension Mother    • Hyperlipidemia Father    • Hypertension Father    • Arthritis Paternal Grandfather    • Osteoporosis Paternal Grandfather        Meds/Allergies     Medications Prior to Admission   Medication   • albuterol (PROVENTIL HFA,VENTOLIN HFA) 90 mcg/act inhaler   • amphetamine-dextroamphetamine (ADDERALL XR) 20 MG 24 hr capsule   • hydroxychloroquine (PLAQUENIL) 200 mg tablet   • Symbicort 160-4.5 MCG/ACT inhaler   • naproxen (NAPROSYN) 500 mg tablet     Current Facility-Administered Medications   Medication Dose Route Frequency   • acetaminophen (TYLENOL) tablet 650 mg  650 mg Oral Q6H PRN   • albuterol (PROVENTIL HFA,VENTOLIN HFA) inhaler 2 puff  2 puff Inhalation Q6H PRN   • aluminum-magnesium hydroxide-simethicone (MAALOX) oral suspension 30 mL  30 mL Oral Q6H PRN   • amphetamine-dextroamphetamine (ADDERALL) tablet 10 mg  10 mg Oral BID before breakfast/lunch   • budesonide-formoterol (SYMBICORT) 160-4.5 mcg/act inhaler 2 puff  2 puff Inhalation BID   • cyclobenzaprine (FLEXERIL) tablet 5 mg  5 mg Oral TID   • dextromethorphan-guaiFENesin (ROBITUSSIN DM) oral syrup 10 mL  10 mL Oral Q4H PRN   • enoxaparin (LOVENOX) subcutaneous injection 40 mg  40 mg Subcutaneous Q12H [facility]   • gabapentin (NEURONTIN) capsule 300 mg  300 mg Oral TID   • hydroxychloroquine (PLAQUENIL) tablet 200 mg  200 mg Oral BID   • melatonin tablet 6 mg  6 mg Oral HS PRN   • methylPREDNISolone sodium succinate (Solu-MEDROL) injection 40 mg  40 mg Intravenous Daily   • montelukast (SINGULAIR) tablet 10 mg  10 mg Oral HS • nystatin (MYCOSTATIN) oral suspension 500,000 Units  500,000 Units Swish & Swallow 4x Daily   • nystatin (MYCOSTATIN) powder   Topical BID   • ondansetron (ZOFRAN) injection 4 mg  4 mg Intravenous Q6H PRN   • oxyCODONE (ROXICODONE) IR tablet 5 mg  5 mg Oral Q4H PRN   • pantoprazole (PROTONIX) EC tablet 40 mg  40 mg Oral Early Morning       Allergies   Allergen Reactions   • Ketorolac Angioedema     Pt with respiratory distress and angioedema of tongue following administration of ketorolac in PACU   • Latex Other (See Comments) and Shortness Of Breath     Patient states it has been a long time but she vaguely remembers having difficulty breathing and swelling up   • Effexor [Venlafaxine]    • Viibryd [Vilazodone Hcl] Other (See Comments)     "passed out and was unresponsive"   • Chlorhexidine Hives and Rash     Hives following chlorhexidine prep with nerve block. Hives following chlorhexidine prep with nerve block. Objective     Blood pressure (!) 182/91, pulse (!) 123, temperature 99 °F (37.2 °C), temperature source Oral, resp. rate 18, height 5' 3" (1.6 m), weight 104 kg (228 lb 2.8 oz), SpO2 92 %, not currently breastfeeding. No intake or output data in the 24 hours ending 09/28/23 0805    PHYSICAL EXAM  Physical Exam  Vitals reviewed. Constitutional:       Appearance: Normal appearance. HENT:      Head: Normocephalic and atraumatic. Right Ear: External ear normal.      Left Ear: External ear normal.      Nose: Nose normal.      Mouth/Throat:      Mouth: Mucous membranes are moist.   Eyes:      Pupils: Pupils are equal, round, and reactive to light. Neck:     Cardiovascular:      Rate and Rhythm: Normal rate and regular rhythm. Pulses: Normal pulses. Pulmonary:      Effort: Pulmonary effort is normal.   Abdominal:      General: Bowel sounds are normal.      Palpations: Abdomen is soft. Musculoskeletal:      Comments: LUE with intact ROM.   Tenderness to palpation left forearm with light touch. NVI. No significant edema noted. 1+distal radius and ulna pulse. Skin:     General: Skin is warm. Capillary Refill: Capillary refill takes less than 2 seconds. Findings: Rash present. Neurological:      General: No focal deficit present. Mental Status: She is alert and oriented to person, place, and time. Psychiatric:         Mood and Affect: Mood normal.         Behavior: Behavior normal.         Thought Content: Thought content normal.         Judgment: Judgment normal.           Lab Results:   I have personally reviewed pertinent lab results. , CBC:   Lab Results   Component Value Date    WBC 36.56 (HH) 09/28/2023    HGB 12.3 09/28/2023    HCT 39.6 09/28/2023    MCV 88 09/28/2023     (H) 09/28/2023    RBC 4.50 09/28/2023    MCH 27.3 09/28/2023    MCHC 31.1 (L) 09/28/2023    RDW 14.3 09/28/2023    MPV 9.2 09/28/2023    NRBC 0 09/28/2023   , CMP:   Lab Results   Component Value Date    SODIUM 136 09/28/2023    K 4.1 09/28/2023     09/28/2023    CO2 24 09/28/2023    BUN 14 09/28/2023    CREATININE 0.68 09/28/2023    CALCIUM 9.4 09/28/2023    AST 20 09/28/2023    ALT 36 09/28/2023    ALKPHOS 58 09/28/2023    EGFR 110 09/28/2023   , Coagulation: No results found for: "PT", "INR", "APTT", Urinalysis: No results found for: "COLORU", "CLARITYU", "SPECGRAV", "PHUR", "LEUKOCYTESUR", "NITRITE", "PROTEINUA", "GLUCOSEU", "Lisa Po", "BILIRUBINUR", "BLOODU"  Imaging: I have personally reviewed pertinent reports. EKG, Pathology, and Other Studies: I have personally reviewed pertinent reports. Counseling / Coordination of Care  Total time spent today  30 minutes. Greater than 50% of total time was spent with the patient and / or family counseling and / or coordination of care.          Narciso Sotelo PA-C  9/28/2023 8:05 AM

## 2023-09-28 NOTE — SPEECH THERAPY NOTE
Speech-Language Pathology Bedside Swallow Evaluation        Patient Name: Roger Angel    KXMVL'S Date: 9/28/2023     Problem List  Principal Problem:    Pneumonia  Active Problems:    ADHD (attention deficit hyperactivity disorder)    Tobacco abuse    Leukocytosis    History of recent hospitalization    Sepsis ([address])    Atelectasis    Asthma    GABBY (obstructive sleep apnea)    Allergy    Postoperative pain    Chest pain         Summary    Pt presents with normal appearing oral and pharyngeal swallowing skills. Pt stated she feels her throat swelling and has difficulty breathing and foods getting stuck- it does not happen all the time. Pt also has hx of GERD, but does not take omeprazole consistently. Pt stated her swallowing feels much improved. Recommendations:   Diet: regular diet and thin liquids   Meds: whole with liquid   Frequent Oral care: 2x/day  Other Recommendations/ considerations: no follow up tx needed at this time        Current Medical Status  Pt is a 43 y.o. female who presented to [address] [place] [address]  with pneumonia. Pt presents with SOB x 2 days. Patient reports on dc from [address] [place] [address] on 9/22/2023 she had non-productive coughing fits. Benadryl helped. Thought she was having an asthma attack but tried home nebulizer and even some steroid that her daughter had left over but her coughing and PRATHER worsened. Had malaise, too, anorexia x 2 days. Notes swelling in her arms and pitting edema in bl le which is not normal for her. Past medical history:   Please see H&P for details    Special Studies:  CTA chest pe study: 9/27/23 1. Improved areas of groundglass opacity in the right upper lobe and right lower lobe and right middle lobe. 2. Trace left pleural effusion is improved. 3. Left lower lobe lung consolidation/atelectasis is slightly worsened/increased in size compared to the prior exam.  4. No evidence of pulmonary embolism.     Social/Education/Vocational Hx:  Pt lives with family    Swallow Information   Current Risks for Dysphagia & Aspiration: hx of GERD, h/o angioedema   Current Symptoms/Concerns: c/o food dysphagia   Current Diet: NPO   Baseline Diet: regular diet and thin liquids  Takes pills- whole w/ water     Baseline Assessment   Behavior/Cognition: alert  Speech/Language Status: able to participate in conversation and able to follow commands  Patient Positioning: upright in bed     Swallow Mechanism Exam   Facial: symmetrical  Labial: WFL  Lingual: WFL  Velum: unable to visualize  Mandible: adequate ROM  Dentition: adequate  Vocal quality:clear/adequate   Volitional Cough: strong/productive   Respiratory: RA    Consistencies Assessed and Performance   Consistencies Administered: thin liquids, puree and soft solids (toast)     Oral Stage: pt able to bite toast, drink liquids from straw w/ good oral control. Mastication/manipulation and transfer were WNL. No oral residue noted. Pharyngeal Stage: swallows were prompt and complete w/ no overt s/s aspiration or c/o food dysphagia symptoms- stasis in throat.        Esophageal Concerns: none reported      Results Reviewed with: patient, RN, PA and family   Dysphagia Goals: none at this time      April Griffin Cabral, [address] [facility]  Speech Pathologist  [place] license # [id number]  [place] license # [id number]  Available via [employer]

## 2023-09-28 NOTE — ASSESSMENT & PLAN NOTE
· Patient reports she always has leukocytosis, appears her baseline is around 12?   · WBC 36 today, likely secondary to allergen and starting IV Solu-Medrol  · Monitor CBC tomorrow

## 2023-09-28 NOTE — CONSULTS
DERMATOLOGY:  CONSULTATION   Darnelle Dancer 43 y.o. 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 Now updated to say this is an allergy in patient's med list. Patient presented with more of an anaphylaxis like picture with throat and tongue swelling, edema, and urticaria. Resolved with administration of epinephrine. Continued on antihistamines and prednisone without recurrence of symptoms. Patient denies any isolated forms of angioedema or swelling of the lips. Less likely this is a form of acquired or inherited forms of angioedema. Will follow up on C4 (good screening test). Recommendations:     · Follow up on C4 level  · Continue antihistamines. Can add Second generation H1 antihistamine such as Zrytec (cetirizine) 10mg daily. · Continue prednisone 0.5-1 mg/kg daily         ALLERGIC CONTACT DERMATITIS     Likely secondary to surgical prep or adhesives used for surgery. Can use topical triamcinolone 0.1% ointment BID to area surrounding surgical site. Should improve with continued systemic steroids. Please set up discharge follow up by calling our office: [phone])     Thank you for involving me in the care of your patient. Please call with questions, change in clinical status or if tests recommended above are abnormal.     Discussed with the primary service.       History:     HISTORY OF PRESENT ILLNESS:    Reason for Consult: rash   HPI: Darnelle Dancer is a 43y.o. year old female PMH of asthma, thoracic outlet sdr s/p REMOVAL 1ST/CERVICAL ZQV 7/66/1293 complicated by anaphylaxis to Toradol, ADHD, obesity, GABBY who presents with SOB. Was having chest tightness, peripheral edema, lip and tongue swelling and was [facility] on 9/25. Dermatology was consulted for rash on chest and management of angioedema. ROS:  ROS is negative otherwise stated in HPI. PAST MEDICAL HISTORY:  Past Medical History:   Diagnosis Date   • Allergic rhinitis    • Asthma    • Psychiatric disorder      Past Surgical History:   Procedure Laterality Date   • LAPAROSCOPIC ENDOMETRIOSIS FULGURATION     • TONSILLECTOMY     • WRIST SURGERY Right     wrist shattered / bone graft       FAMILY HISTORY:  Non-contributory    SOCIAL HISTORY:  Social History   Single  Social History     Substance and Sexual Activity   Alcohol Use No     Social History     Substance and Sexual Activity   Drug Use No     Social History     Tobacco Use   Smoking Status Every Day   • Packs/day: 0.25   • Years: 8.00   • Total pack years: 2.00   • Types: Cigarettes   Smokeless Tobacco Never   Tobacco Comments    patient on chantix to quit       ALLERGIES:  Allergies   Allergen Reactions   • Ketorolac Angioedema     Pt with respiratory distress and angioedema of tongue following administration of ketorolac in PACU   • Latex Other (See Comments) and Shortness Of Breath     Patient states it has been a long time but she vaguely remembers having difficulty breathing and swelling up   • Effexor [Venlafaxine]    • Viibryd [Vilazodone Hcl] Other (See Comments)     "passed out and was unresponsive"   • Chlorhexidine Hives and Rash     Hives following chlorhexidine prep with nerve block. Hives following chlorhexidine prep with nerve block. MEDICATIONS:  All current active medications have been reviewed. Labs, Imaging, & Other Studies     Lab Results:  I have personally reviewed pertinent labs.      Results from last 7 days   Lab Units 09/28/23  0457 09/27/23  0211 09/26/23  0556   WBC Thousand/uL 36.56* 17.29* 16.63*   HEMOGLOBIN g/dL 12.3 12.3 11.5   PLATELETS Thousands/uL 463* 411* 375     Results from last 7 days   Lab Units 09/28/23  0457 09/27/23  0441 09/26/23  0556   POTASSIUM mmol/L 4.1 4.3 3.7   CHLORIDE mmol/L 103 104 101   CO2 mmol/L 24 24 26   BUN mg/dL 14 11 10   CREATININE mg/dL 0.68 0.74 0.75   EGFR ml/min/1.73sq m 110 102 100   CALCIUM mg/dL 9.4 9.5 9.0   AST U/L 20 25 26   ALT U/L 36 42 41   ALK PHOS U/L 58 65 61     Results from last 7 days   Lab Units 09/25/23  2301 09/25/23  1447 09/25/23  1359   BLOOD CULTURE   --  No Growth at 48 hrs. No Growth at 48 hrs. LEGIONELLA URINARY ANTIGEN  Negative  --   --            Pathology:   I have personally reviewed pertinent reports.

## 2023-09-28 NOTE — PROGRESS NOTES
[address]  Progress Note  Name: Manjinder Zapata  MRN: [id number]  Unit/Bed#: S -01 I Date of Admission: 9/25/2023   Date of Service: 9/28/2023 I Hospital Day: 3    Assessment/Plan   SOB (shortness of breath)  Assessment & Plan  · GGO mostly the right lung on CTA PE, could represent atypical PNA vs pulm edema from fluid overload  · Procal is negative x 2. Dc abx and monitor sxs/ CBC  · Pulm toilet  · Symptomatic treatment  · Covid/flu RSV negative  · Fu on CT chest as OP eventually to monitor for resolution  · Procal x 2 is negative   · Will DC abx  · But given hypoxia and ongoing symptoms will consult pulmonology-continue to monitor while giving IV Solu-Medrol, start lupus work-up  · Likely not pneumonia, likely more secondary to inflammatory event    * Allergy  Assessment & Plan  · H/o angioedema reaction from Toradol in the PACU after her thoracic outlet surgery vs idiopathic angioedema vs subjective globus sensation (in setting of known reflux and no PPI or control, exacerbated by intubation/surgery)   · Start PPI, IV Benadryl, IV Solu-Medrol twice daily  · Was seen by allergy service at [facility] and according to them nonspecific mast cell degranulation from opioids leading to tongue swelling was also on the differential. Minimize opioid use now. She should not need narcotics on dc from this hospitalization anyway  · Was provided with referral to Dr. Grabiel Gomez who is an allergist/immunologist and is part of [facility].  She has plan to fu with allergy testing with him on Friday 9/29/2023, unfortunately it has been canceled and will be rescheduled in future  · Was dc from [facility] on on cetirizine 10 mg with the option of uptitration to 10 mg bid if that wasn't controlling her sxs however the attending commented that he did not think this medication would make a really great impact on her sxs  · Started IV solumedrol   · Now with rash as well (improving over course of day)  · Dermatology consulted-is not starting autoimmune work-up  · Follow-up with complement labs    GABBY (obstructive sleep apnea)  Assessment & Plan  · CPAP qhs  · Of note, patient saw ENT as an outpatient following her initial difficult intubation back in May 2022. Flexible laryngoscopy performed w/o e/o mass or lesion and mild arytenoid edema suggestive of reflux. May benefit from diagnostic bronchoscopy as OP to take a look past the vocal cords. · H/o challenging intubation due to reactive airway with severe bronchospasm and copious secretions. Atelectasis  Assessment & Plan  · OOB as tolerated  · Incentive spirometer    History of recent hospitalization  Assessment & Plan  · Hospitalized at [facility] for scheduled thoracic outlet surgery 9/19/2023 - 9/22/2023  · Cont home pain regimen: oxy, gabapentin, flexeril  · Vascular consulted to rule out possible surgical complication, I have discussed with the vascular team at [facility] on 3 different occasions to discuss case. Patient remains medically appropriate to continue to treat at [facility]  · Patient reports she always has leukocytosis, appears her baseline is around 12? · WBC 36 today, likely secondary to allergen and starting IV Solu-Medrol  · Monitor CBC tomorrow    Tobacco abuse  Assessment & Plan  ·  NRT    ADHD (attention deficit hyperactivity disorder)  Assessment & Plan  · Cont formulary sub for Home adderrall           VTE Pharmacologic Prophylaxis: VTE Score: 4 Moderate Risk (Score 3-4) - Pharmacological DVT Prophylaxis Ordered: enoxaparin (Lovenox). Patient Centered Rounds: I performed bedside rounds with nursing staff today. Discussions with Specialists or Other Care Team Provider: Cm, derm, vasc, pulm    Education and Discussions with Family / Patient: Updated  (father and brother) at bedside. Total Time Spent on Date of Encounter in care of patient: 196 mins.  This time was spent on one or more of the following: performing physical exam; counseling and coordination of care; obtaining or reviewing history; documenting in the medical record; reviewing/ordering tests, medications or procedures; communicating with other healthcare professionals and discussing with patient's family/caregivers. Current Length of Stay: 3 day(s)  Current Patient Status: Inpatient   Certification Statement: The patient will continue to require additional inpatient hospital stay due to Ongoing care and evaluation for possible allergy with angioedema, still currently needing IV Solu-Medrol and IV Benadryl  Discharge Plan: Anticipate discharge in 48-72 hrs to home. Code Status: Level 1 - Full Code    Subjective:   Patient reports being very frustrated with overall care however after lengthy discussion patient feels care going forward is appropriate and she is willing to stay at [facility].  She feels that current treatment is improving her overall symptoms. She continues to have a rash that is improving and swelling that she believes is improving. She still continues to report some shortness of breath. She denies chest pain/pressure, lightheadedness, or nausea today. Objective:     Vitals:   Temp (24hrs), Av.9 °F (37.2 °C), Min:98.8 °F (37.1 °C), Max:99 °F (37.2 °C)    Temp:  [98.8 °F (37.1 °C)-99 °F (37.2 °C)] 98.8 °F (37.1 °C)  HR:  [106-123] 106  Resp:  [18] 18  BP: (156-182)/(87-91) 156/87  SpO2:  [92 %-96 %] 96 %  Body mass index is 40.39 kg/m². Input and Output Summary (last 24 hours):   No intake or output data in the 24 hours ending 23 [id number]    Physical Exam:   Physical Exam  Vitals and nursing note reviewed. Constitutional:       Appearance: She is obese. HENT:      Head: Normocephalic. Nose: Nose normal.      Mouth/Throat:      Mouth: Mucous membranes are moist.      Pharynx: Oropharynx is clear.       Comments: Tongue has mild edema, no tonsils, back of mouth and throat have no erythema and appear nonedematous  Eyes:      General: No scleral icterus. Conjunctiva/sclera: Conjunctivae normal.      Pupils: Pupils are equal, round, and reactive to light. Neck:      Comments: Edematous left side with erythema. Erythema appears to extend onto face and mid back including torso. Erythema on chest extends about bra line  Cardiovascular:      Rate and Rhythm: Normal rate and regular rhythm. Heart sounds: No murmur heard. No friction rub. No gallop. Pulmonary:      Effort: Pulmonary effort is normal. No respiratory distress. Breath sounds: Normal breath sounds. No stridor. No wheezing, rhonchi or rales. Abdominal:      General: Abdomen is flat. Palpations: Abdomen is soft. Musculoskeletal:         General: Normal range of motion. Cervical back: Normal range of motion and neck supple. Right lower leg: No edema. Left lower leg: No edema. Lymphadenopathy:      Cervical: No cervical adenopathy. Skin:     General: Skin is warm. Coloration: Skin is not jaundiced or pale. Findings: No bruising, erythema or lesion. Neurological:      General: No focal deficit present. Mental Status: She is alert and oriented to person, place, and time. Mental status is at baseline. Cranial Nerves: No cranial nerve deficit. Motor: No weakness. Psychiatric:         Mood and Affect: Mood normal.         Behavior: Behavior normal.         Thought Content:  Thought content normal.          Additional Data:     Labs:  Results from last 7 days   Lab Units 09/28/23  0457 09/27/23  0441   WBC Thousand/uL 36.56* 17.29*   HEMOGLOBIN g/dL 12.3 12.3   HEMATOCRIT % 39.6 39.9   PLATELETS Thousands/uL 463* 411*   BANDS PCT %  --  1   NEUTROS PCT % 82*  --    LYMPHS PCT % 7*  --    LYMPHO PCT %  --  10*   MONOS PCT % 8  --    MONO PCT %  --  1*   EOS PCT % 0 1     Results from last 7 days   Lab Units 09/28/23  0457   SODIUM mmol/L 136   POTASSIUM mmol/L 4.1 CHLORIDE mmol/L 103   CO2 mmol/L 24   BUN mg/dL 14   CREATININE mg/dL 0.68   ANION GAP mmol/L 9   CALCIUM mg/dL 9.4   ALBUMIN g/dL 4.0   TOTAL BILIRUBIN mg/dL 0.29   ALK PHOS U/L 58   ALT U/L 36   AST U/L 20   GLUCOSE RANDOM mg/dL 127     Results from last 7 days   Lab Units 09/25/23  1251   INR  0.90     Results from last 7 days   Lab Units 09/27/23  0439   POC GLUCOSE mg/dl 163*         Results from last 7 days   Lab Units 09/26/23  0556 09/25/23  1447 09/25/23  1251   LACTIC ACID mmol/L  --  1.2  --    PROCALCITONIN ng/ml 0.06  --  0.07       Lines/Drains:  Invasive Devices     Peripheral Intravenous Line  Duration           Peripheral IV 09/26/23 Left Forearm 1 day                      Imaging: No pertinent imaging reviewed. Recent Cultures (last 7 days):   Results from last 7 days   Lab Units 09/25/23  2301 09/25/23  1447 09/25/23  1359   BLOOD CULTURE   --  No Growth at 48 hrs. No Growth at 48 hrs.    LEGIONELLA URINARY ANTIGEN  Negative  --   --        Last 24 Hours Medication List:   Current Facility-Administered Medications   Medication Dose Route Frequency Provider Last Rate   • acetaminophen  650 mg Oral Q6H PRN Deb Dejesus PA-C     • albuterol  2 puff Inhalation Q6H PRN Deb Dejesus PA-C     • aluminum-magnesium hydroxide-simethicone  30 mL Oral Q6H PRN Deb Dejesus PA-C     • amphetamine-dextroamphetamine  10 mg Oral BID before breakfast/lunch Deb Dejesus PA-C     • budesonide-formoterol  2 puff Inhalation BID Deb Dejesus PA-C     • cyclobenzaprine  5 mg Oral TID Willa Easton PA-C     • dextromethorphan-guaiFENesin  10 mL Oral Q4H PRN Deb Dejesus PA-C     • enoxaparin  40 mg Subcutaneous Q12H [facility] Deb Dejesus PA-C     • gabapentin  300 mg Oral TID Deb Dejesus PA-C     • hydroxychloroquine  200 mg Oral BID Deb Dejesus PA-C     • melatonin  6 mg Oral HS PRN Deb Demo, PA-C     • methylPREDNISolone sodium succinate  40 mg Intravenous Q12H [address] Yaritza Bagley PA-C     • montelukast  10 mg Oral HS Trevor James NATALIA Dejesus     • nystatin  500,000 Units Swish & Swallow 4x Daily Usman Kim PA-C     • nystatin   Topical BID Cesario Hart PA-C     • ondansetron  4 mg Intravenous Q6H PRN Deb Dejesus PA-C     • oxyCODONE  5 mg Oral Q4H PRN Pamela Wick PA-C     • pantoprazole  40 mg Oral Early Morning Suzi Westfall PA-C          Today, Patient Was Seen By: Marc Goldmann Prator, PA-C    **Please Note: This note may have been constructed using a voice recognition system. **

## 2023-09-28 NOTE — PROGRESS NOTES
Progress Note - Acute Pain Service    Brenda Harrell 43 y.o. female MRN: [id number]  Unit/Bed#: S -01 Encounter: [id number]      Brenda Harrell is a 43 y.o. female status post left first rib resection with continued pain. Postoperative pain  Assessment & Plan  · Tylenol 650 mg p.o. every 6 hours as needed mild pain. · Change Flexeril to 5 mg p.o. 3 times daily scheduled. · Gabapentin 300 mg p.o. 3 times daily scheduled. · Oxycodone 5 mg p.o. every 4 hours as needed moderate to severe pain. · Bowel regimen while on opioid pain medication to avoid opioid-induced constipation. · Ice to painful area for up to 20 minutes every hour as needed. · Encourage continuation of left arm movement to avoid stiffness and weakness which can lead to worsening pain. · At discharge, suggest the following:  · Tylenol 650 mg p.o. every 6 hours as needed mild pain. · Flexeril 5 mg p.o. 3 times daily as needed muscle spasm. · Gabapentin 300 mg p.o. 3 times daily. · Oxycodone 5 mg p.o. every 6 hours as needed moderate-severe pain x2 days, then discontinue. · Bowel regimen while on opioid pain medication. GABBY (obstructive sleep apnea)  Assessment & Plan  • Patient's with sleep apnea are at higher risk of respiratory depression secondary to opioid use. • Attempt to minimize use of opioid pain medication while maintaining adequate analgesia. Tobacco abuse  Assessment & Plan  • Smokers have been shown to require higher doses of opioid pain medication and are more likely to develop chronic pain. • Encourage smoking cessation. APS will sign off at this time. Thank you for the consult. All opioids and other analgesics to be written at discretion of primary team. Please contact Acute Pain Service - via [employer] from [phone] with additional questions or concerns. See [employer] or [employer] for additional contacts and after hours information.     Pain History  Current pain location(s):  Pain Score: 8  Pain Location/Orientation: Location: Arm, Location: Neck  Pain Scale: Pain Assessment Tool: 0-10  24 hour history: Patient's left shoulder/neck pain well controlled on current regimen. Patient feels as though she can begin to decrease use of as needed opioids at this point and will attempt to do so. Opioid requirement previous 24 hours: Oxycodone 5 mg p.o. x5    Meds/Allergies   all current active meds have been reviewed, current meds:   Current Facility-Administered Medications   Medication Dose Route Frequency   • acetaminophen (TYLENOL) tablet 650 mg  650 mg Oral Q6H PRN   • albuterol (PROVENTIL HFA,VENTOLIN HFA) inhaler 2 puff  2 puff Inhalation Q6H PRN   • aluminum-magnesium hydroxide-simethicone (MAALOX) oral suspension 30 mL  30 mL Oral Q6H PRN   • amphetamine-dextroamphetamine (ADDERALL) tablet 10 mg  10 mg Oral BID before breakfast/lunch   • budesonide-formoterol (SYMBICORT) 160-4.5 mcg/act inhaler 2 puff  2 puff Inhalation BID   • cyclobenzaprine (FLEXERIL) tablet 5 mg  5 mg Oral TID   • dextromethorphan-guaiFENesin (ROBITUSSIN DM) oral syrup 10 mL  10 mL Oral Q4H PRN   • enoxaparin (LOVENOX) subcutaneous injection 40 mg  40 mg Subcutaneous Q12H [address]   • gabapentin (NEURONTIN) capsule 300 mg  300 mg Oral TID   • hydroxychloroquine (PLAQUENIL) tablet 200 mg  200 mg Oral BID   • melatonin tablet 6 mg  6 mg Oral HS PRN   • methylPREDNISolone sodium succinate (Solu-MEDROL) injection 40 mg  40 mg Intravenous Daily   • montelukast (SINGULAIR) tablet 10 mg  10 mg Oral HS   • nystatin (MYCOSTATIN) oral suspension 500,000 Units  500,000 Units Swish & Swallow 4x Daily   • nystatin (MYCOSTATIN) powder   Topical BID   • ondansetron (ZOFRAN) injection 4 mg  4 mg Intravenous Q6H PRN   • oxyCODONE (ROXICODONE) IR tablet 5 mg  5 mg Oral Q4H PRN   • pantoprazole (PROTONIX) EC tablet 40 mg  40 mg Oral Early Morning    and PTA meds:   Prior to Admission Medications   Prescriptions Last Dose Informant Patient Reported? Taking? Symbicort 160-4.5 MCG/ACT inhaler 9/25/2023 Self Yes Yes   Sig: TAKE 2 PUFFS BY MOUTH TWICE A DAY IN THE MORNING AND IN THE EVENING   albuterol (PROVENTIL HFA,VENTOLIN HFA) 90 mcg/act inhaler 9/25/2023 Self Yes Yes   Sig: Inhale 2 puffs every 6 (six) hours as needed for wheezing   amphetamine-dextroamphetamine (ADDERALL XR) 20 MG 24 hr capsule Past Week Self Yes Yes   Sig: Take 20 mg by mouth every morning   hydroxychloroquine (PLAQUENIL) 200 mg tablet 9/24/2023 Self Yes Yes   Sig: TAKE 1 TABLET (200 MG TOTAL) BY MOUTH 2 TIMES A DAY. naproxen (NAPROSYN) 500 mg tablet  Self No No   Sig: Take 1 tablet (500 mg total) by mouth 2 (two) times a day with meals for 10 days      Facility-Administered Medications: None       Allergies   Allergen Reactions   • Ketorolac Angioedema     Pt with respiratory distress and angioedema of tongue following administration of ketorolac in PACU   • Latex Other (See Comments) and Shortness Of Breath     Patient states it has been a long time but she vaguely remembers having difficulty breathing and swelling up   • Effexor [Venlafaxine]    • Viibryd [Vilazodone Hcl] Other (See Comments)     "passed out and was unresponsive"   • Chlorhexidine Hives and Rash     Hives following chlorhexidine prep with nerve block. Hives following chlorhexidine prep with nerve block. Objective        Vitals:    09/27/23 1453 09/27/23 2024 09/28/23 0600 09/28/23 0700   BP: 136/84 (!) 182/91  156/87   BP Location: Right arm Right arm     Pulse: (!) 113 (!) 123  (!) 106   Resp: 17 18  18   Temp: 99 °F (37.2 °C) 99 °F (37.2 °C)  98.8 °F (37.1 °C)   TempSrc: Oral Oral     SpO2: 94% 92%  96%   Weight:   104 kg (228 lb 2.8 oz)    Height:             Physical Exam  Vitals and nursing note reviewed. Constitutional:       General: She is awake. She is not in acute distress. Appearance: She is not ill-appearing, toxic-appearing or diaphoretic. Skin:     General: Skin is warm and dry. Neurological:      Mental Status: She is alert and oriented to person, place, and time. GCS: GCS eye subscore is 4. GCS verbal subscore is 5. GCS motor subscore is 6. Psychiatric:         Attention and Perception: Attention normal.         Speech: Speech normal.         Behavior: Behavior normal. Behavior is cooperative. Lab Results:   Estimated Creatinine Clearance: 128 mL/min (by C-G formula based on SCr of 0.68 mg/dL). Lab Results   Component Value Date    WBC 36.56 (HH) 09/28/2023    WBC 12.96 (H) 11/14/2014    HGB 12.3 09/28/2023    HGB 13.2 11/14/2014    HCT 39.6 09/28/2023    HCT 41.2 11/14/2014     (H) 09/28/2023     11/14/2014         Component Value Date/Time     11/14/2014 1317    K 4.1 09/28/2023 0457    K 3.6 11/14/2014 1317     09/28/2023 0457     11/14/2014 1317    CO2 24 09/28/2023 0457    CO2 28 11/14/2014 1317    BUN 14 09/28/2023 0457    BUN 10 11/14/2014 1317    CREATININE 0.68 09/28/2023 0457    CREATININE 0.54 (L) 11/14/2014 1317         Component Value Date/Time    CALCIUM 9.4 09/28/2023 0457    CALCIUM 9.2 11/14/2014 1317    ALKPHOS 58 09/28/2023 0457    AST 20 09/28/2023 0457    ALT 36 09/28/2023 0457    TP 7.1 09/28/2023 0457    ALB 4.0 09/28/2023 0457       Imaging Studies/EKG: I have personally reviewed pertinent reports. Counseling / Coordination of Care  Total floor / unit time spent today 30 minutes minutes. Greater than 50% of total time was spent with the patient and / or family counseling and / or coordination of care. A description of the counseling / coordination of care: Patient interview, physical examination, review of medical records, review of imaging and laboratory data, development of pain management plan, discussion of pain management plan with patient and primary service. Please note that the APS provides consultative services regarding pain management only.   With the exception of ketamine and epidural infusions and except when indicated, final decisions regarding starting or changing doses of analgesic medications are at the discretion of the consulting service.     Jarvis Fagan PA-C   Acute Pain Service

## 2023-09-29 ENCOUNTER — APPOINTMENT (INPATIENT)
Dept: RADIOLOGY | Facility: HOSPITAL | Age: 38
DRG: 205 | End: 2023-09-29
Payer: COMMERCIAL

## 2023-09-29 VITALS
TEMPERATURE: 98.6 F | BODY MASS INDEX: 40.68 KG/M2 | RESPIRATION RATE: 16 BRPM | HEIGHT: 63 IN | HEART RATE: 98 BPM | SYSTOLIC BLOOD PRESSURE: 146 MMHG | OXYGEN SATURATION: 96 % | WEIGHT: 229.6 LBS | DIASTOLIC BLOOD PRESSURE: 91 MMHG

## 2023-09-29 PROBLEM — J98.6 DIAPHRAGM PARALYSIS: Status: ACTIVE | Noted: 2023-09-25

## 2023-09-29 PROBLEM — K59.01 SLOW TRANSIT CONSTIPATION: Status: ACTIVE | Noted: 2023-09-29

## 2023-09-29 LAB
ANION GAP SERPL CALCULATED.3IONS-SCNC: 10 MMOL/L
BUN SERPL-MCNC: 15 MG/DL (ref 5–25)
CALCIUM SERPL-MCNC: 9.1 MG/DL (ref 8.4–10.2)
CHLORIDE SERPL-SCNC: 102 MMOL/L (ref 96–108)
CO2 SERPL-SCNC: 23 MMOL/L (ref 21–32)
CREAT SERPL-MCNC: 0.74 MG/DL (ref 0.6–1.3)
DSDNA AB SER-ACNC: <1 IU/ML (ref 0–9)
ERYTHROCYTE [DISTWIDTH] IN BLOOD BY AUTOMATED COUNT: 14.4 % (ref 11.6–15.1)
GFR SERPL CREATININE-BSD FRML MDRD: 102 ML/MIN/1.73SQ M
GLUCOSE SERPL-MCNC: 148 MG/DL (ref 65–140)
HCT VFR BLD AUTO: 41.8 % (ref 34.8–46.1)
HGB BLD-MCNC: 12.4 G/DL (ref 11.5–15.4)
MCH RBC QN AUTO: 27.8 PG (ref 26.8–34.3)
MCHC RBC AUTO-ENTMCNC: 29.7 G/DL (ref 31.4–37.4)
MCV RBC AUTO: 94 FL (ref 82–98)
PLATELET # BLD AUTO: 397 THOUSANDS/UL (ref 149–390)
PMV BLD AUTO: 8.9 FL (ref 8.9–12.7)
POTASSIUM SERPL-SCNC: 4.5 MMOL/L (ref 3.5–5.3)
RBC # BLD AUTO: 4.46 MILLION/UL (ref 3.81–5.12)
SODIUM SERPL-SCNC: 135 MMOL/L (ref 135–147)
TOTAL IGE SMQN RAST: 35.9 KU/L (ref 0–113)
WBC # BLD AUTO: 27.26 THOUSAND/UL (ref 4.31–10.16)

## 2023-09-29 PROCEDURE — 76000 FLUOROSCOPY <1 HR PHYS/QHP: CPT

## 2023-09-29 PROCEDURE — 99232 SBSQ HOSP IP/OBS MODERATE 35: CPT | Performed by: INTERNAL MEDICINE

## 2023-09-29 PROCEDURE — 80048 BASIC METABOLIC PNL TOTAL CA: CPT

## 2023-09-29 PROCEDURE — 85027 COMPLETE CBC AUTOMATED: CPT

## 2023-09-29 PROCEDURE — 99239 HOSP IP/OBS DSCHRG MGMT >30: CPT | Performed by: NURSE PRACTITIONER

## 2023-09-29 PROCEDURE — 94660 CPAP INITIATION&MGMT: CPT

## 2023-09-29 PROCEDURE — 83520 IMMUNOASSAY QUANT NOS NONAB: CPT

## 2023-09-29 RX ORDER — ACETAMINOPHEN 325 MG/1
650 TABLET ORAL EVERY 6 HOURS PRN
Qty: 60 TABLET | Refills: 0 | OUTPATIENT
Start: 2023-09-29

## 2023-09-29 RX ORDER — HYDROXYCHLOROQUINE SULFATE 200 MG/1
200 TABLET, FILM COATED ORAL 2 TIMES DAILY
Qty: 120 TABLET | Refills: 0 | OUTPATIENT
Start: 2023-09-29 | End: 2023-11-28

## 2023-09-29 RX ORDER — POLYETHYLENE GLYCOL 3350 17 G/17G
17 POWDER, FOR SOLUTION ORAL DAILY
Qty: 30 EACH | Refills: 0 | OUTPATIENT
Start: 2023-09-29

## 2023-09-29 RX ORDER — PANTOPRAZOLE SODIUM 40 MG/1
40 TABLET, DELAYED RELEASE ORAL
Qty: 30 TABLET | Refills: 0 | OUTPATIENT
Start: 2023-09-29

## 2023-09-29 RX ORDER — CYCLOBENZAPRINE HCL 5 MG
5 TABLET ORAL 3 TIMES DAILY
Qty: 30 TABLET | Refills: 0 | OUTPATIENT
Start: 2023-09-29

## 2023-09-29 RX ORDER — OXYCODONE HYDROCHLORIDE 5 MG/1
5 TABLET ORAL EVERY 4 HOURS PRN
Qty: 30 TABLET | Refills: 0 | OUTPATIENT
Start: 2023-09-29 | End: 2023-10-09

## 2023-09-29 RX ORDER — DEXTROAMPHETAMINE SACCHARATE, AMPHETAMINE ASPARTATE, DEXTROAMPHETAMINE SULFATE AND AMPHETAMINE SULFATE 2.5; 2.5; 2.5; 2.5 MG/1; MG/1; MG/1; MG/1
10 TABLET ORAL
Qty: 20 TABLET | Refills: 0 | OUTPATIENT
Start: 2023-09-30 | End: 2023-10-10

## 2023-09-29 RX ORDER — POLYETHYLENE GLYCOL 3350 17 G/17G
17 POWDER, FOR SOLUTION ORAL DAILY
Status: DISCONTINUED | OUTPATIENT
Start: 2023-09-29 | End: 2023-09-29 | Stop reason: HOSPADM

## 2023-09-29 RX ORDER — LORATADINE 10 MG/1
10 TABLET ORAL DAILY
Status: DISCONTINUED | OUTPATIENT
Start: 2023-09-29 | End: 2023-09-29 | Stop reason: HOSPADM

## 2023-09-29 RX ORDER — ECHINACEA PURPUREA EXTRACT 125 MG
1 TABLET ORAL
Qty: 30 ML | Refills: 0 | OUTPATIENT
Start: 2023-09-29

## 2023-09-29 RX ORDER — BUDESONIDE AND FORMOTEROL FUMARATE DIHYDRATE 160; 4.5 UG/1; UG/1
2 AEROSOL RESPIRATORY (INHALATION) 2 TIMES DAILY
Qty: 10.2 G | Refills: 0 | OUTPATIENT
Start: 2023-09-29

## 2023-09-29 RX ORDER — MONTELUKAST SODIUM 10 MG/1
10 TABLET ORAL
Qty: 30 TABLET | Refills: 0 | OUTPATIENT
Start: 2023-09-29

## 2023-09-29 RX ORDER — ENOXAPARIN SODIUM 100 MG/ML
40 INJECTION SUBCUTANEOUS EVERY 12 HOURS SCHEDULED
Qty: 30 ML | Refills: 0 | OUTPATIENT
Start: 2023-09-29

## 2023-09-29 RX ORDER — GABAPENTIN 300 MG/1
300 CAPSULE ORAL 3 TIMES DAILY
Qty: 60 CAPSULE | Refills: 0 | OUTPATIENT
Start: 2023-09-29

## 2023-09-29 RX ORDER — LORATADINE 10 MG/1
10 TABLET ORAL DAILY
Qty: 30 TABLET | Refills: 0 | OUTPATIENT
Start: 2023-09-30

## 2023-09-29 RX ORDER — NYSTATIN 100000 [USP'U]/G
POWDER TOPICAL 2 TIMES DAILY
Qty: 30 G | Refills: 0 | OUTPATIENT
Start: 2023-09-29

## 2023-09-29 RX ORDER — MAGNESIUM HYDROXIDE/ALUMINUM HYDROXICE/SIMETHICONE 120; 1200; 1200 MG/30ML; MG/30ML; MG/30ML
30 SUSPENSION ORAL EVERY 6 HOURS PRN
Qty: 200 ML | Refills: 0 | OUTPATIENT
Start: 2023-09-29

## 2023-09-29 RX ORDER — LORATADINE 10 MG/1
10 TABLET ORAL DAILY
Status: DISCONTINUED | OUTPATIENT
Start: 2023-09-29 | End: 2023-09-29

## 2023-09-29 RX ORDER — LANOLIN ALCOHOL/MO/W.PET/CERES
6 CREAM (GRAM) TOPICAL
Qty: 30 TABLET | Refills: 0 | OUTPATIENT
Start: 2023-09-29

## 2023-09-29 RX ADMIN — SALINE NASAL SPRAY 1 SPRAY: 1.5 SOLUTION NASAL at 17:06

## 2023-09-29 RX ADMIN — OXYCODONE HYDROCHLORIDE 5 MG: 5 TABLET ORAL at 08:18

## 2023-09-29 RX ADMIN — NYSTATIN 1 APPLICATION: 100000 POWDER TOPICAL at 18:01

## 2023-09-29 RX ADMIN — NYSTATIN 500000 UNITS: 100000 SUSPENSION ORAL at 08:04

## 2023-09-29 RX ADMIN — BUDESONIDE AND FORMOTEROL FUMARATE DIHYDRATE 2 PUFF: 160; 4.5 AEROSOL RESPIRATORY (INHALATION) at 17:06

## 2023-09-29 RX ADMIN — GUAIFENESIN AND DEXTROMETHORPHAN 10 ML: 100; 10 SYRUP ORAL at 08:05

## 2023-09-29 RX ADMIN — OXYCODONE HYDROCHLORIDE 5 MG: 5 TABLET ORAL at 13:35

## 2023-09-29 RX ADMIN — LORATADINE 10 MG: 10 TABLET ORAL at 09:39

## 2023-09-29 RX ADMIN — CYCLOBENZAPRINE 5 MG: 10 TABLET, FILM COATED ORAL at 17:09

## 2023-09-29 RX ADMIN — GABAPENTIN 300 MG: 300 CAPSULE ORAL at 08:04

## 2023-09-29 RX ADMIN — NYSTATIN: 100000 POWDER TOPICAL at 08:05

## 2023-09-29 RX ADMIN — ENOXAPARIN SODIUM 40 MG: 40 INJECTION SUBCUTANEOUS at 08:04

## 2023-09-29 RX ADMIN — HYDROXYCHLOROQUINE SULFATE 200 MG: 200 TABLET, FILM COATED ORAL at 18:01

## 2023-09-29 RX ADMIN — PANTOPRAZOLE SODIUM 40 MG: 40 TABLET, DELAYED RELEASE ORAL at 04:45

## 2023-09-29 RX ADMIN — CYCLOBENZAPRINE 5 MG: 10 TABLET, FILM COATED ORAL at 08:04

## 2023-09-29 RX ADMIN — HYDROXYCHLOROQUINE SULFATE 200 MG: 200 TABLET, FILM COATED ORAL at 08:06

## 2023-09-29 RX ADMIN — POLYETHYLENE GLYCOL 3350 17 G: 17 POWDER, FOR SOLUTION ORAL at 17:05

## 2023-09-29 RX ADMIN — GABAPENTIN 300 MG: 300 CAPSULE ORAL at 17:06

## 2023-09-29 RX ADMIN — NYSTATIN 500000 UNITS: 100000 SUSPENSION ORAL at 17:06

## 2023-09-29 RX ADMIN — METHYLPREDNISOLONE SODIUM SUCCINATE 40 MG: 40 INJECTION, POWDER, FOR SOLUTION INTRAMUSCULAR; INTRAVENOUS at 08:04

## 2023-09-29 RX ADMIN — BUDESONIDE AND FORMOTEROL FUMARATE DIHYDRATE 2 PUFF: 160; 4.5 AEROSOL RESPIRATORY (INHALATION) at 08:05

## 2023-09-29 RX ADMIN — NYSTATIN 500000 UNITS: 100000 SUSPENSION ORAL at 13:37

## 2023-09-29 RX ADMIN — OXYCODONE HYDROCHLORIDE 5 MG: 5 TABLET ORAL at 01:23

## 2023-09-29 NOTE — ASSESSMENT & PLAN NOTE
Patient is s/p surgery at [facility] for thoracic outlet syndrome and is s/p excision and removal of 1st cervical rib with Dr. Katt Olivas of vascular surgery. Since then, the patient has had ongoing and worsening SOB. Differential on admission to the hospital included PE, infectious causes including pneumonia, and/or inflammatory responses such as an allergic reaction due to an associated rash and globus sensation. · GGO mostly the right lung on CTA, could represent atypical PNA vs pulm edema   · Procal was negative x 2. Stopped antibiotics as she does not appear to have an active infection   · Encourage IS as able   · Covid/flu RSV negative  · Consulted pulmonology due to persistent symptoms despite interventions  · SNIFF test done on 9/29/23 and showed left hemidiaphragm paralysis which is likely due to her surgical procedure and likely causing her SOB symptom  · Accompanied Dr. Amado Saint from pulmonary to bedside for multidisciplinary rounds today.  He explained this clearly and all questions were answered   · Have contacted [facility] vascular surgeon again today and they have accepted patient for transfer

## 2023-09-29 NOTE — ASSESSMENT & PLAN NOTE
· Hospitalized at [facility] for scheduled thoracic outlet surgery 9/19/2023 - 9/22/2023  · Cont home pain regimen: oxy, gabapentin, flexeril  · Transfer back to [facility] for left hemidiaphragm paralysis

## 2023-09-29 NOTE — ASSESSMENT & PLAN NOTE
· Scar area with some erythema   · As per patient erythema worse since discharge from [address] [place] [address] Dr   · Improved today and there is no evidence of infection  · Working on transfer for her primary problem so the vascular team can reassess

## 2023-09-29 NOTE — ASSESSMENT & PLAN NOTE
· H/o angioedema reaction from Toradol in the PACU after her thoracic outlet surgery vs idiopathic angioedema vs subjective globus sensation (in setting of known reflux and no PPI or control, exacerbated by intubation/surgery)   · Staredt PPI, IV Benadryl, IV Solu-Medrol twice daily  · Was seen by allergy service at [facility] and according to them nonspecific mast cell degranulation from opioids leading to tongue swelling was also on the differential. Minimize opioid use now. · Was provided with referral to Dr. Eddie Mcgee who is an allergist/immunologist and is part of [facility]. She had planned to f/u with allergy testing with him today 9/29/2023, but that appt had been canceled due to her continued admission  · Was dc from [facility] on on cetirizine 10 mg with the option of uptitration to 10 mg bid if that wasn't controlling her sxs however the attending commented that he did not think this medication would make a really great impact on her sxs  · Started IV solumedrol and will continue upon transfer   · Now with rash as well (improving over course of day)  · Dermatology consulted-is now starting autoimmune work-up  · Follow-up with complement labs. She will need continued immunology work up as an outpatient.

## 2023-09-29 NOTE — ASSESSMENT & PLAN NOTE
· Tachycardia, leukocytosis on admission however the tachycardia was likely related to her diaphragmatic paralysis and oxygen demand and leukosytosis is persistent. She has started work up for inflammatory or allergic causes of her eosinophilia in the past and will pursue this once the primary problem is addressed. · The patient is not septic.

## 2023-09-29 NOTE — UTILIZATION REVIEW
Continued Stay Review    Date: 9/29               Current Patient Class: IP Current Level of Care: MS    HPI:38 y.o. female initially admitted on 9/25    Assessment/Plan:   Recurrent angioedema. Pt confirms she has had this before. Unsure if it was with Toradol. Consider dx of Gleich syndrome. Will get sniff test today. Waiting other labs - ANCA, anti-double-stranded DNA, antihistone antibody and tryptase level. Rash improving. If she develops diaphragmatic paralysis recommend transfer to [facility] for eval of Phrenic nerve. Remains on IV steroids. WBC down to 27.26. Sniff test - Findings are consistent with  an elevated and paralyzed left hemidiaphragm, demonstrating paradoxical motion. Vital Signs:   09/29/23 15:21:54 -- -- 16 146/91 109 -- -- -- -- -- --   09/29/23 0200 -- -- -- -- -- -- -- -- -- Full face mask --   09/28/23 21:22:55 98.6 °F (37 °C) 98 18 138/92 107 -- -- -- -- -- Sitting   09/28/23 1335 -- 106 Abnormal  -- 156/87 -- -- -- -- -- -- --   09/28/23 0700 98.8 °F (37.1 °C) 106 Abnormal  18 156/87 110 96 % -- -- -- -- --   09/28/23 0127 -- -- -- -- -- -- -- -- -- Full face mask --   09/27/23 20:24:21 99 °F (37.2 °C) 123 Abnormal  18 182/91 Abnormal  121 92 % 28 2 L/min Nasal cannula -- Sitting   09/27/23 14:53:43 99 °F (37.2 °C) 113 Abnormal  17 136/84 101 94 % 28 2 L/min Nasal cannula -- Sitting   09/27/23 0756 -- -- -- 144/80 -- -- -- -- -- -- --   09/27/23 07:48:37 97.9 °F (36.6 °C) 85 18 71/53 Abnormal  59 Abnormal  95 % 36 4 L/min Nasal cannula -- Lying   09/27/23 0300 -- 104 18 -- -- 97 % -- -- -- -- --     Pertinent Labs/Diagnostic Results:     9/29 Sniff test - Findings are consistent with  an elevated and paralyzed left hemidiaphragm, demonstrating paradoxical motion. 9/28 Echo -  •  Left Ventricle: Left ventricular cavity size is normal. Wall thickness is normal. The left ventricular ejection fraction is 65%.  Systolic function is normal. Wall motion is normal.  • Right Ventricle: Right ventricular cavity size is normal. Systolic function is normal.  •  No significant valvular disease.     Results from last 7 days   Lab Units 09/29/23  0432 09/28/23 0457 09/27/23 0441 09/26/23  0556 09/25/23  1251   WBC Thousand/uL 27.26* 36.56* 17.29* 16.63* 18.66*   HEMOGLOBIN g/dL 12.4 12.3 12.3 11.5 11.5   HEMATOCRIT % 41.8 39.6 39.9 36.5 36.1   PLATELETS Thousands/uL 397* 463* 411* 375 311   NEUTROS ABS Thousands/µL  --  29.96*  --  9.69* 12.26*   BANDS PCT %  --   --  1  --   --          Results from last 7 days   Lab Units 09/29/23 0432 09/28/23 0457 09/27/23 0441 09/26/23  0556 09/25/23  1251   SODIUM mmol/L 135 136 136 136 138   POTASSIUM mmol/L 4.5 4.1 4.3 3.7 3.7   CHLORIDE mmol/L 102 103 104 101 104   CO2 mmol/L 23 24 24 26 25   ANION GAP mmol/L 10 9 8 9 9   BUN mg/dL 15 14 11 10 8   CREATININE mg/dL 0.74 0.68 0.74 0.75 0.68   EGFR ml/min/1.73sq m 102 110 102 100 110   CALCIUM mg/dL 9.1 9.4 9.5 9.0 9.1     Results from last 7 days   Lab Units 09/28/23 0457 09/27/23 0441 09/26/23  0556   AST U/L 20 25 26   ALT U/L 36 42 41   ALK PHOS U/L 58 65 61   TOTAL PROTEIN g/dL 7.1 7.2 6.6   ALBUMIN g/dL 4.0 4.1 3.9   TOTAL BILIRUBIN mg/dL 0.29 0.31 0.36     Results from last 7 days   Lab Units 09/27/23  0439   POC GLUCOSE mg/dl 163*     Results from last 7 days   Lab Units 09/29/23  0432 09/28/23 0457 09/27/23 0441 09/26/23  0556 09/25/23  1251   GLUCOSE RANDOM mg/dL 148* 127 174* 89 91     Results from last 7 days   Lab Units 09/27/23  1348 09/27/23  1203 09/25/23  1700 09/25/23  1522 09/25/23  1251   HS TNI 0HR ng/L  --  2  --   --  4   HS TNI 2HR ng/L 3  --   --  4  --    HSTNI D2 ng/L 1  --   --  0  --    HS TNI 4HR ng/L  --   --  4  --   --    HSTNI D4 ng/L  --   --  0  --   --      Results from last 7 days   Lab Units 09/27/23  1203   D-DIMER QUANTITATIVE ug/ml FEU 1.57*     Results from last 7 days   Lab Units 09/25/23  1251   PROTIME seconds 12.7   INR  0.90   PTT seconds 23 Results from last 7 days   Lab Units 09/26/23  0556 09/25/23  1251   PROCALCITONIN ng/ml 0.06 0.07     Results from last 7 days   Lab Units 09/25/23  1447   LACTIC ACID mmol/L 1.2             Results from last 7 days   Lab Units 09/25/23  1251   BNP pg/mL 26     Results from last 7 days   Lab Units 09/25/23  1337   CLARITY UA  Clear   COLOR UA  Light Yellow   SPEC GRAV UA  1.011   PH UA  7.0   GLUCOSE UA mg/dl Negative   KETONES UA mg/dl Negative   BLOOD UA  Negative   PROTEIN UA mg/dl Negative   NITRITE UA  Negative   BILIRUBIN UA  Negative   UROBILINOGEN UA (BE) mg/dl <2.0   LEUKOCYTES UA  Negative     Results from last 7 days   Lab Units 09/25/23  2301   STREP PNEUMONIAE ANTIGEN, URINE  Negative   LEGIONELLA URINARY ANTIGEN  Negative     Results from last 7 days   Lab Units 09/25/23  1447 09/25/23  1359   BLOOD CULTURE  No Growth at 72 hrs. No Growth at 72 hrs.          Medications:   Scheduled Medications:  amphetamine-dextroamphetamine, 10 mg, Oral, BID before breakfast/lunch  budesonide-formoterol, 2 puff, Inhalation, BID  cyclobenzaprine, 5 mg, Oral, TID  enoxaparin, 40 mg, Subcutaneous, Q12H JEEVAN  gabapentin, 300 mg, Oral, TID  hydroxychloroquine, 200 mg, Oral, BID  loratadine, 10 mg, Oral, Daily  methylPREDNISolone sodium succinate, 40 mg, Intravenous, Q12H JEEVAN  montelukast, 10 mg, Oral, HS  nystatin, 500,000 Units, Swish & Swallow, 4x Daily  nystatin, , Topical, BID  pantoprazole, 40 mg, Oral, Early Morning      Continuous IV Infusions:     PRN Meds:  acetaminophen, 650 mg, Oral, Q6H PRN  albuterol, 2 puff, Inhalation, Q6H PRN  aluminum-magnesium hydroxide-simethicone, 30 mL, Oral, Q6H PRN  dextromethorphan-guaiFENesin, 10 mL, Oral, Q4H PRN  melatonin, 6 mg, Oral, HS PRN  ondansetron, 4 mg, Intravenous, Q6H PRN  oxyCODONE, 5 mg, Oral, Q4H PRN  sodium chloride, 1 spray, Each Nare, Q1H PRN    Discharge Plan: D    Network Utilization Review Department  ATTENTION: Please call with any questions or concerns to [phone] and carefully listen to the prompts so that you are directed to the right person. All voicemails are confidential.  Laura Renteria all requests for admission clinical reviews, approved or denied determinations and any other requests to dedicated fax number below belonging to the campus where the patient is receiving treatment.  List of dedicated fax numbers for the Facilities:  [place] DENIALS (Administrative/Medical Necessity) [phone] [address] [facility] (Maternity/NICU/Pediatrics) [phone]   [address] [place] [address] [phone]   [facility] [address] [facility] [phone]   [address] [place] [address] [phone]   [address] [address] [phone]

## 2023-09-29 NOTE — ASSESSMENT & PLAN NOTE
· This is non-cardiac pain and is related to her surgical procedure and recovery  · CT chest done and was negative for PE

## 2023-09-29 NOTE — DISCHARGE SUMMARY
[address] [facility]  Discharge- Candelario Apo 1985, 43 y.o. female MRN: [id number]  Unit/Bed#: S -01 Encounter: [id number]  Primary Care Provider: Ayden Vargas MD   Date and time admitted to hospital: 9/25/2023 11:56 AM    * Diaphragm paralysis  Assessment & Plan  Patient is s/p surgery at [facility] for thoracic outlet syndrome and is s/p excision and removal of 1st cervical rib with Dr. Samson Justin of vascular surgery. Since then, the patient has had ongoing and worsening SOB. Differential on admission to the hospital included PE, infectious causes including pneumonia, and/or inflammatory responses such as an allergic reaction due to an associated rash and globus sensation. · GGO mostly the right lung on CTA, could represent atypical PNA vs pulm edema   · Procal was negative x 2. Stopped antibiotics as she does not appear to have an active infection   · Encourage IS as able   · Covid/flu RSV negative  · Consulted pulmonology due to persistent symptoms despite interventions  · SNIFF test done on 9/29/23 and showed left hemidiaphragm paralysis which is likely due to her surgical procedure and likely causing her SOB symptom  · Accompanied Dr. Jade Butcher from pulmonary to bedside for multidisciplinary rounds today. He explained this clearly and all questions were answered   · Have contacted [facility] vascular surgeon again today and they have accepted patient for transfer     [address]  · H/o angioedema reaction from Toradol in the PACU after her thoracic outlet surgery vs idiopathic angioedema vs subjective globus sensation (in setting of known reflux and no PPI or control, exacerbated by intubation/surgery)   · Staredt PPI, IV Benadryl, IV Solu-Medrol twice daily  · Was seen by allergy service at [facility] and according to them nonspecific mast cell degranulation from opioids leading to tongue swelling was also on the differential. Minimize opioid use now.    · Was provided with referral to Dr. Karla Kat who is an allergist/immunologist and is part of [facility]. She had planned to f/u with allergy testing with him today 9/29/2023, but that appt had been canceled due to her continued admission  · Was dc from [facility] on on cetirizine 10 mg with the option of uptitration to 10 mg bid if that wasn't controlling her sxs however the attending commented that he did not think this medication would make a really great impact on her sxs  · Started IV solumedrol and will continue upon transfer   · Now with rash as well (improving over course of day)  · Dermatology consulted-is now starting autoimmune work-up  · Follow-up with complement labs. She will need continued immunology work up as an outpatient. Asthma  Assessment & Plan  · Not acutely exacerbated  · Albuterol inhaler prn  · Cont home Singulair and Symbicort    Atelectasis  Assessment & Plan  · OOB as tolerated  · Incentive spirometer    ADHD (attention deficit hyperactivity disorder)  Assessment & Plan  · Cont formulary sub for Home adderrall    Chest pain  Assessment & Plan  · This is non-cardiac pain and is related to her surgical procedure and recovery  · CT chest done and was negative for PE      History of recent hospitalization  Assessment & Plan  · Hospitalized at [facility] for scheduled thoracic outlet surgery 9/19/2023 - 9/22/2023  · Cont home pain regimen: oxy, gabapentin, flexeril  · Transfer back to [facility] for left hemidiaphragm paralysis     Leukocytosis  Assessment & Plan  · Patient reports she always has leukocytosis, appears her baseline is around 12? · WBC 27 today, 16-->17-->36-->27  · Sudden increase cold be due to steroid effect     GABBY (obstructive sleep apnea)  Assessment & Plan  · CPAP qhs  · Of note, patient saw ENT as an outpatient following her initial difficult intubation back in May 2022. Flexible laryngoscopy performed w/o e/o mass or lesion and mild arytenoid edema suggestive of reflux.  May benefit from diagnostic bronchoscopy as OP to take a look past the vocal cords. · H/o challenging intubation due to reactive airway with severe bronchospasm and copious secretions. Postoperative pain  Assessment & Plan  · Scar area with some erythema   · As per patient erythema worse since discharge from [address] [place] [address] Dr   · Improved today and there is no evidence of infection  · Working on transfer for her primary problem so the vascular team can reassess       Slow transit constipation  Assessment & Plan  · Add miralax  · Encourage oob     Sepsis ([address])  Assessment & Plan  · Tachycardia, leukocytosis on admission however the tachycardia was likely related to her diaphragmatic paralysis and oxygen demand and leukosytosis is persistent. She has started work up for inflammatory or allergic causes of her eosinophilia in the past and will pursue this once the primary problem is addressed. · The patient is not septic. Tobacco abuse  Assessment & Plan  · She needs to stop smoking. · Nicotine replacement therapy has been offered       Medical Problems     Resolved Problems  Date Reviewed: 9/29/2023          Resolved    Fluid overload 9/27/2023     Resolved by  Renee Kerr PA-C        Discharging Physician / Practitioner: Daryl Boas, CRNP  PCP: Fariba Stevens MD  Admission Date:   Admission Orders (From admission, onward)     Ordered        09/25/23 1724  INPATIENT ADMISSION  Once                      Discharge Date: 09/29/23    Consultations During Hospital Stay:  · Pulmonology   · Dermatology  · Vascular surgery  · Acute pain service     Procedures Performed:   · CTA 1.   · Improved areas of groundglass opacity in the right upper lobe and right lower lobe and right middle lobe. 2. Trace left pleural effusion is improved. 3. Left lower lobe lung consolidation/atelectasis is slightly worsened/increased in size compared to the prior exam.   4. No evidence of pulmonary embolism. 5. Mild cardiomegaly.    · SNIFF test positive for left hemidiaphragm paralysis     Significant Findings / Test Results:   · Left hemidiaphragm paralysis     Incidental Findings:   · none    Test Results Pending at Discharge (will require follow up):   · none     Outpatient Tests Requested:  · none    Complications:  none    Reason for Admission: SOB    Hospital Course:   Dalia Walton is a 43 y.o. female patient who originally presented to the hospital on 9/25/2023 due to shortness of breath for 2 days. Patient has a past medical history of asthma, thoracic outlet disorder status post removal of the first cervical rib on 9/19/2023 and then subsequent anaphylaxis to Toradol postoperatively. She also is a history of ADHD, obesity, obstructive sleep apnea and nicotine abuse. She was discharged from [facility] following her surgery and then had nonproductive coughing fits. She says that Benadryl helped. She thought she was having an asthma attack but tried home nebulizer and even tried some steroids that her daughter had leftover which did not help. So she came to the hospital.  She denied chest pain with exertion she denied presyncopal episodes. Work-up ensued. 2 CAT scans were done. Initially we were suspecting an asthma exacerbation or infection due to her being postoperative. There was concern for PE as well. Procalcitonin's were negative x2, CAT scans x2 did not show any PEs. There was groundglass opacities on the right-hand side and there was also left atelectasis. Given the continued symptomatology despite treatment, pulmonology was consulted. Due to her unspecified autoimmune connective tissue disease and thoracic outlet syndrome multiple labs were drawn. She had a borderline elevated C4 and a significantly elevated C3. She had a negative MARTHA MARTHA. She has had intermittent eosinophilia for the past few months but could be dating back to when she was 14.   Upon further discussion with the patient and pulmonology, there are likely 2 different scenarios occurring here. The first 1 is her recurrent angioedema which is suspected due to Toradol based allergy. However given the recurrent eosinophilia, Gleich syndrome should be in the differential.  The patient should follow-up with immunology on an outpatient basis for continued work-up for this. As far as her shortness of breath, this is likely multifactorial however she does have an elevated left hemidiaphragm. Given her recent left-sided cervical surgery, there is concern for diaphragmatic paralysis. She went for a sniff test this morning which did confirm she has left kieran diaphragmatic paralysis. She remains on 2 L of oxygen however she is stable. We discussed this with the vascular team at [facility] and they have agreed to accept her upon transfer. She is medically stable to be transferred there when they have a bed. Her father and brother have been updated and all questions have been answered to the best of our ability. Please see above list of diagnoses and related plan for additional information. Condition at Discharge: stable    Discharge Day Visit / Exam:   Subjective: Patient continues to have shortness of breath and feels as though it is worse with walking back and forth to the bathroom. She does not have a cough. The redness around her incision is much better. She is very frustrated with the hospital course but is relieved to know that she is able to go to [facility]. Vitals: Blood Pressure: 146/91 (09/29/23 1521)  Pulse: 98 (09/28/23 2122)  Temperature: 98.6 °F (37 °C) (09/28/23 2122)  Temp Source: Oral (09/28/23 2122)  Respirations: 16 (09/29/23 1521)  Height: 5' 3" (160 cm) (09/28/23 1335)  Weight - Scale: 104 kg (229 lb 9.6 oz) (09/29/23 0440)  SpO2: 96 % (09/28/23 0700)  Exam:   Physical Exam  Constitutional:       General: She is not in acute distress. HENT:      Head: Normocephalic. Nose: Nose normal.      Mouth/Throat:      Mouth: Mucous membranes are dry. Cardiovascular:      Rate and Rhythm: Regular rhythm. Tachycardia present. Pulses: Normal pulses. Heart sounds: Normal heart sounds. Pulmonary:      Effort: Pulmonary effort is normal.      Breath sounds: Normal breath sounds. Comments: Decreased breath sounds on the left   Abdominal:      General: Abdomen is flat. Palpations: Abdomen is soft. Musculoskeletal:         General: Normal range of motion. Skin:     General: Skin is warm. Capillary Refill: Capillary refill takes less than 2 seconds. Neurological:      General: No focal deficit present. Mental Status: She is alert and oriented to person, place, and time. Psychiatric:         Mood and Affect: Mood normal.         Behavior: Behavior normal.            Discussion with Family: Updated  (father) at bedside. Discharge instructions/Information to patient and family:   See after visit summary for information provided to patient and family. Provisions for Follow-Up Care:  See after visit summary for information related to follow-up care and any pertinent home health orders. Disposition:   [facility] Transfer to [address] [place] [address] Readmission: no      Discharge Statement:  I spent 85 minutes discharging the patient. This time was spent on the day of discharge. I had direct contact with the patient on the day of discharge. Greater than 50% of the total time was spent examining patient, answering all patient questions, arranging and discussing plan of care with patient as well as directly providing post-discharge instructions. Additional time then spent on discharge activities. Discharge Medications:  See after visit summary for reconciled discharge medications provided to patient and/or family.       **Please Note: This note may have been constructed using a voice recognition system**

## 2023-09-29 NOTE — PROGRESS NOTES
Progress Note - Pulmonary   Chino Hylton 43 y.o. female MRN: [id number]  Unit/Bed#: S -01 Encounter: [id number]    Assessment/Plan:       Dyspnea  Patient has subjective globus sensation in the setting of reflux which was exacerbated by recent extubation, with erythematous rash of the chest face, with increased swelling of bilateral lower extremity starting Sunday (9/14) with increase of 14 lbs since surgery. CT scan shows elevated left hemidiagphragm. Possible phrenic nerve damage from recent surgery. Thought to be due to angioedema   Etiology probably Secondary vs primary angioedema  Most likely secondary given normal levels of C1 esterase in [place] in 9/20  Likely secondary to toradol use. 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 Low suspicion of active infection  Procal was normal, no fevers, chills  C4 is mildly elevated,  MARTHA negative  Histone ab, IGE, anti double stranded, and c1 esterase inh still pending  WBC today is 27  Pending FL sniff test    Plan:  Patient currently on solumedrol 40 mg BID  Spoke with derm, they advise claritin 10 mg once daily and increase weekly to dose of 40 mg once per day  Give Epinephrine with throat closure  Will need allergologist referral    Obstructive sleep apnea  Patient was initially diagnosed in 2016 and is compliant with CPAP  Average pressure of 15 cm, AHI 1.0 per pulmonology notes from [facility]  We will continue while admitted     Mild intermittent asthma without complication  Patient seen pulmonology at [address] [place] [address]  Last PFT was in 7/2023  FEV1 FVC was normal with significant bronchodilator response, TLC 79%, DLCO 94%.   Home medications: Albuterol as needed, Symbicort 2 puffs twice daily  Appears well controlled     Seasonal allergies  On Claritin daily  Symbicort BID     Thoracic outlet syndrome  Sees vascular surgery as outpatient  Initially presented to vascular on 12/28/2022 where patient had progressive swelling, numbness and discoloration of the left arm. Paresthesias worsened with abduction. She was referred to neurology for scalene block which was done on 4/6/2023, which relieved her paresthesias. She was referred for rib removal on 5/16/2023. Procedure was aborted due to significant challenging intubation at that time, during which time patient spent time in the ICU due to bronchospasms with copious secretions. 9/19 status post left first cervical rib removal with general anesthesia. Patient spent time postprocedure and surgical ICU. Patient had throat fullness after procedure. Suspicion for anaphylaxis, patient was given Benadryl, hydrocortisone, infusion postextubation        Unspecified autoimmune/connective tissue disease  Patient does have history of arthralgia involving wrists, elbows, ankles, hips, low back, hands, with livedo rash, sicca symptoms and fatigue for several years. Elevated MARTHA,  Has been on Plaquenil since 12/2021 with improvement of joint pain and fatigue. Sees rheumatology as outpatient       Chief Complaint:    SOB    Subjective:  Patient is doing better today. Still has the rash on her face and chest area. That is pruritic. No shortness of breath, patient is able to breathe fine. Has been afebrile overnight. Unfortunately patient wishes to be transferred to [facility] where she has had most of her work-up and treatment done in the past.  She has already notified primary care provider according to her. No other subjective complaints noted      Objective:    Vitals: Blood pressure 138/92, pulse 98, temperature 98.6 °F (37 °C), temperature source Oral, resp. rate 18, height 5' 3" (1.6 m), weight 104 kg (229 lb 9.6 oz), SpO2 96 %, not currently breastfeeding. room air,Body mass index is 40.67 kg/m².     No intake or output data in the 24 hours ending 09/29/23 0820    Invasive Devices     Peripheral Intravenous Line  Duration           Peripheral IV 09/28/23 Right;Ventral (anterior) Forearm <1 day                Physical Exam:     Physical Exam  Constitutional:       Appearance: She is obese. She is not ill-appearing or toxic-appearing. HENT:      Head: Normocephalic and atraumatic. Right Ear: There is no impacted cerumen. Left Ear: There is no impacted cerumen. Mouth/Throat:      Mouth: Mucous membranes are moist.      Pharynx: Oropharynx is clear. Eyes:      Conjunctiva/sclera: Conjunctivae normal.      Pupils: Pupils are equal, round, and reactive to light. Cardiovascular:      Rate and Rhythm: Normal rate and regular rhythm. Pulses: Normal pulses. Heart sounds: Normal heart sounds. No murmur heard. No friction rub. Pulmonary:      Effort: No respiratory distress. Breath sounds: Rales present. Abdominal:      General: There is no distension. Palpations: There is no mass. Tenderness: There is no abdominal tenderness. Hernia: No hernia is present. Musculoskeletal:         General: No swelling, tenderness or deformity. Cervical back: Normal range of motion. Right lower leg: Edema present. Left lower leg: Edema present. Skin:     Coloration: Skin is not jaundiced. Findings: No rash. Neurological:      General: No focal deficit present. Labs: I have personally reviewed pertinent lab results. Imaging and other studies: I have personally reviewed pertinent reports.

## 2023-09-29 NOTE — PLAN OF CARE
Problem: PAIN - ADULT  Goal: Verbalizes/displays adequate comfort level or baseline comfort level  Description: Interventions:  - Encourage patient to monitor pain and request assistance  - Assess pain using appropriate pain scale  - Administer analgesics based on type and severity of pain and evaluate response  - Implement non-pharmacological measures as appropriate and evaluate response  - Consider cultural and social influences on pain and pain management  - Notify physician/advanced practitioner if interventions unsuccessful or patient reports new pain  Outcome: Progressing     Problem: INFECTION - ADULT  Goal: Absence or prevention of progression during hospitalization  Description: INTERVENTIONS:  - Assess and monitor for signs and symptoms of infection  - Monitor lab/diagnostic results  - Monitor all insertion sites, i.e. indwelling lines, tubes, and drains  - Monitor endotracheal if appropriate and nasal secretions for changes in amount and color  - [place] appropriate cooling/warming therapies per order  - Administer medications as ordered  - Instruct and encourage patient and family to use good hand hygiene technique  - Identify and instruct in appropriate isolation precautions for identified infection/condition  Outcome: Progressing  Goal: Absence of fever/infection during neutropenic period  Description: INTERVENTIONS:  - Monitor WBC    Outcome: Progressing     Problem: SAFETY ADULT  Goal: Patient will remain free of falls  Description: INTERVENTIONS:  - Educate patient/family on patient safety including physical limitations  - Instruct patient to call for assistance with activity   - Consult OT/PT to assist with strengthening/mobility   - Keep Call bell within reach  - Keep bed low and locked with side rails adjusted as appropriate  - Keep care items and personal belongings within reach  - Initiate and maintain comfort rounds  - Make Fall Risk Sign visible to staff  - Offer Toileting every  Hours, in advance of need  - Initiate/Maintain alarm  - Obtain necessary fall risk management equipment:   - Apply yellow socks and bracelet for high fall risk patients  - Consider moving patient to room near nurses station  Outcome: Progressing  Goal: Maintain or return to baseline ADL function  Description: INTERVENTIONS:  -  Assess patient's ability to carry out ADLs; assess patient's baseline for ADL function and identify physical deficits which impact ability to perform ADLs (bathing, care of mouth/teeth, toileting, grooming, dressing, etc.)  - Assess/evaluate cause of self-care deficits   - Assess range of motion  - Assess patient's mobility; develop plan if impaired  - Assess patient's need for assistive devices and provide as appropriate  - Encourage maximum independence but intervene and supervise when necessary  - Involve family in performance of ADLs  - Assess for home care needs following discharge   - Consider OT consult to assist with ADL evaluation and planning for discharge  - Provide patient education as appropriate  Outcome: Progressing  Goal: Maintains/Returns to pre admission functional level  Description: INTERVENTIONS:  - Perform BMAT or MOVE assessment daily.   - Set and communicate daily mobility goal to care team and patient/family/caregiver. - Collaborate with rehabilitation services on mobility goals if consulted  - Perform Range of Motion  times a day. - Reposition patient every  hours.   - Dangle patient  times a day  - Stand patient  times a day  - Ambulate patient  times a day  - Out of bed to chair  times a day   - Out of bed for meals  times a day  - Out of bed for toileting  - Record patient progress and toleration of activity level   Outcome: Progressing     Problem: DISCHARGE PLANNING  Goal: Discharge to home or other facility with appropriate resources  Description: INTERVENTIONS:  - Identify barriers to discharge w/patient and caregiver  - Arrange for needed discharge resources and transportation as appropriate  - Identify discharge learning needs (meds, wound care, etc.)  - Arrange for interpretive services to assist at discharge as needed  - Refer to Case Management Department for coordinating discharge planning if the patient needs post-hospital services based on physician/advanced practitioner order or complex needs related to functional status, cognitive ability, or social support system  Outcome: Progressing     Problem: Knowledge Deficit  Goal: Patient/family/caregiver demonstrates understanding of disease process, treatment plan, medications, and discharge instructions  Description: Complete learning assessment and assess knowledge base.   Interventions:  - Provide teaching at level of understanding  - Provide teaching via preferred learning methods  Outcome: Progressing

## 2023-09-29 NOTE — ASSESSMENT & PLAN NOTE
· Patient reports she always has leukocytosis, appears her baseline is around 12?   · WBC 27 today, 16-->17-->36-->27  · Sudden increase cold be due to steroid effect

## 2023-09-30 LAB
BACTERIA BLD CULT: NORMAL
BACTERIA BLD CULT: NORMAL

## 2023-10-02 LAB
C-ANCA TITR SER IF: NORMAL TITER
MYELOPEROXIDASE AB SER IA-ACNC: <0.2 UNITS (ref 0–0.9)
P-ANCA ATYPICAL TITR SER IF: NORMAL TITER
P-ANCA TITR SER IF: NORMAL TITER
PROTEINASE3 AB SER IA-ACNC: <0.2 UNITS (ref 0–0.9)

## 2023-10-02 NOTE — UTILIZATION REVIEW
NOTIFICATION OF ADMISSION DISCHARGE   This is a Notification of Discharge from [facility]. Please be advised that this patient has been discharge from our facility. Below you will find the admission and discharge date and time including the patient’s disposition. UTILIZATION REVIEW CONTACT:  Desirae Granados  Utilization   Network Utilization Review Department  Phone: [phone] x carefully listen to the prompts. All voicemails are confidential.  Email: [email or web address]     ADMISSION INFORMATION  PRESENTATION DATE: 9/25/2023 11:56 AM  OBERVATION ADMISSION DATE:   INPATIENT ADMISSION DATE: 9/25/23  5:24 PM   DISCHARGE DATE: 9/29/2023  7:45 PM   DISPOSITION:Non [facility] Acute Care/Short Term Hosp    IMPORTANT INFORMATION:  Send all requests for admission clinical reviews, approved or denied determinations and any other requests to dedicated fax number below belonging to the campus where the patient is receiving treatment.  List of dedicated fax numbers:  [place] DENIALS (Administrative/Medical Necessity) [phone] [address] [facility] (Maternity/NICU/Pediatrics) [phone]   [facility] [phone]   [facility] [phone] [address] [facility] [phone]   [address] [place] [address] [phone]   [facility] [phone]   [address] [place] [address] [address] [place] [address] [phone]   [address] [place] [address] [phone] [address] [facility] [phone] [address] [facility] [address] [facility] [phone]

## 2023-10-03 LAB — HISTONE IGG SER IA-ACNC: 0.4 UNITS (ref 0–0.9)

## 2023-10-04 LAB — TRYPTASE SERPL-MCNC: 3.1 UG/L (ref 2.2–13.2)

## 2023-10-07 LAB — MISCELLANEOUS LAB TEST RESULT: NORMAL
